# Patient Record
Sex: MALE | Race: WHITE | NOT HISPANIC OR LATINO | Employment: FULL TIME | URBAN - METROPOLITAN AREA
[De-identification: names, ages, dates, MRNs, and addresses within clinical notes are randomized per-mention and may not be internally consistent; named-entity substitution may affect disease eponyms.]

---
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
## 2022-04-25 ENCOUNTER — HOSPITAL ENCOUNTER (EMERGENCY)
Facility: HOSPITAL | Age: 22
Discharge: HOME | End: 2022-04-25
Attending: EMERGENCY MEDICINE
Payer: MEDICAID

## 2022-04-25 VITALS
RESPIRATION RATE: 18 BRPM | WEIGHT: 120 LBS | HEART RATE: 65 BPM | BODY MASS INDEX: 18.83 KG/M2 | SYSTOLIC BLOOD PRESSURE: 124 MMHG | OXYGEN SATURATION: 100 % | HEIGHT: 67 IN | DIASTOLIC BLOOD PRESSURE: 80 MMHG | TEMPERATURE: 98 F

## 2022-04-25 DIAGNOSIS — R09.1 PLEURISY: Primary | ICD-10-CM

## 2022-04-25 DIAGNOSIS — J06.9 UPPER RESPIRATORY TRACT INFECTION, UNSPECIFIED TYPE: ICD-10-CM

## 2022-04-25 DIAGNOSIS — R07.89 CHEST TIGHTNESS: ICD-10-CM

## 2022-04-25 LAB
INFLUENZA A, MOLECULAR: NEGATIVE
INFLUENZA B, MOLECULAR: NEGATIVE
SARS-COV-2 RDRP RESP QL NAA+PROBE: NEGATIVE
SPECIMEN SOURCE: NORMAL

## 2022-04-25 PROCEDURE — 93010 ELECTROCARDIOGRAM REPORT: CPT | Mod: ,,, | Performed by: INTERNAL MEDICINE

## 2022-04-25 PROCEDURE — U0002 COVID-19 LAB TEST NON-CDC: HCPCS | Performed by: NURSE PRACTITIONER

## 2022-04-25 PROCEDURE — 99284 EMERGENCY DEPT VISIT MOD MDM: CPT | Mod: 25

## 2022-04-25 PROCEDURE — 87502 INFLUENZA DNA AMP PROBE: CPT | Performed by: NURSE PRACTITIONER

## 2022-04-25 PROCEDURE — 93005 ELECTROCARDIOGRAM TRACING: CPT | Performed by: INTERNAL MEDICINE

## 2022-04-25 PROCEDURE — 93010 EKG 12-LEAD: ICD-10-PCS | Mod: ,,, | Performed by: INTERNAL MEDICINE

## 2022-04-25 RX ORDER — ALBUTEROL SULFATE 90 UG/1
1-2 AEROSOL, METERED RESPIRATORY (INHALATION) EVERY 6 HOURS PRN
Qty: 8 G | Refills: 0 | Status: SHIPPED | OUTPATIENT
Start: 2022-04-25 | End: 2023-04-25

## 2022-04-25 RX ORDER — IBUPROFEN 400 MG/1
400 TABLET ORAL EVERY 6 HOURS PRN
Qty: 20 TABLET | Refills: 0 | Status: SHIPPED | OUTPATIENT
Start: 2022-04-25

## 2022-04-25 NOTE — Clinical Note
"Nate Stock (Joshua)rickey was seen and treated in our emergency department on 4/25/2022.  He may return to work on 04/27/2022.       If you have any questions or concerns, please don't hesitate to call.      Irene Calderon NP"

## 2022-04-25 NOTE — FIRST PROVIDER EVALUATION
Emergency Department TeleTriage Encounter Note      CHIEF COMPLAINT    Chief Complaint   Patient presents with    Pleurisy     Pain to chest with breathing and sinus congestion today       VITAL SIGNS   Initial Vitals [04/25/22 1446]   BP Pulse Resp Temp SpO2   125/86 70 18 98.3 °F (36.8 °C) 100 %      MAP       --            ALLERGIES    Review of patient's allergies indicates:   Allergen Reactions    Penicillins Rash       PROVIDER TRIAGE NOTE  TeleTriage Note: aNte Ashley, a nontoxic/well appearing, 21 y.o. male, presented to the ED with c/o congestion, cough and pain with breathing that began 4/5 hours ago.     All ED beds are full at present; patient notified of this status.  Patient seen and medically screened by Nurse Practitioner via teletriage. Orders initiated at triage to expedite care.  Patient is stable to return to the waiting room and will be placed in an ED bed when available.  Care will be transferred to an alternate provider when patient has been placed in an Exam Room from the [facility] for physical exam, additional orders, and disposition.  2:59 PM Zulma Morgan DNP, FNP-C        ORDERS  Labs Reviewed - No data to display    ED Orders (720h ago, onward)    Start Ordered     Status Ordering Provider    04/25/22 1500 04/25/22 1459  X-Ray Chest PA And Lateral  1 time imaging         Acknowledged ZULMA MORGAN            Virtual Visit Note: The provider triage portion of this emergency department evaluation and documentation was performed via [employer], a HIPAA-compliant telemedicine application, in concert with a tele-presenter in the room. A face to face patient evaluation with one of my colleagues will occur once the patient is placed in an emergency department room.      DISCLAIMER: This note was prepared with [employer] voice recognition transcription software. Garbled syntax, mangled pronouns, and other bizarre constructions may be attributed to that software system.

## 2022-04-25 NOTE — DISCHARGE INSTRUCTIONS
Take over-the-counter Tylenol or prescription Motrin for symptom management.  Albuterol inhaler as needed for shortness of breath and/or wheezing symptoms. OTC mucinex and flonase for sinus congestion symptoms.  Continue closely monitor symptoms.  Follow-up with your PCP for ER visit follow-up re-evaluation.  Follow-up with cardiologist if her symptoms have fully resolved for further evaluation in the next 2-3 days.  Return to the ER for any new worsening symptoms.

## 2022-04-25 NOTE — ED PROVIDER NOTES
Encounter Date: 4/25/2022       History     Chief Complaint   Patient presents with    Pleurisy     Pain to chest with breathing and sinus congestion today     21-year-old male with no previous medical history, who states he stop smoking cigarettes approximately 1 month ago, presents to the ER with sinus congestion runny nose postnasal drip, and pain to his left anterior chest that comes and goes with deep breathing.  He states his symptoms began earlier today.  He currently reports his symptoms have completely resolved and feels much better.  He reports no fever.  No productive cough.  No nausea vomiting abdominal pain neck pain rash lightheadedness other complaints or concerns.  He states he was told he had childhood asthma but this has resolved.  Currently he denies all symptoms at this time.  He requests a work note because he needed to call and for work when his symptoms occurred earlier today.  He states pushing on his chest worsens his symptoms.        Review of patient's allergies indicates:   Allergen Reactions    Penicillins Rash     No past medical history on file.  No past surgical history on file.  No family history on file.     Review of Systems   Constitutional: Negative for chills, diaphoresis, fatigue and fever.   HENT: Positive for congestion, postnasal drip and rhinorrhea. Negative for dental problem, drooling, ear discharge, ear pain, facial swelling, mouth sores, nosebleeds, sinus pressure, sinus pain, sneezing, sore throat and trouble swallowing.    Eyes: Negative for photophobia and visual disturbance.   Respiratory: Positive for cough and chest tightness. Negative for choking, shortness of breath, wheezing and stridor.    Cardiovascular: Positive for chest pain. Negative for palpitations and leg swelling.   Gastrointestinal: Negative for abdominal pain, constipation, diarrhea, nausea and vomiting.   Endocrine: Negative for polydipsia, polyphagia and polyuria.   Genitourinary: Negative for  decreased urine volume, dysuria, flank pain, frequency, hematuria and urgency.   Musculoskeletal: Positive for myalgias. Negative for arthralgias, back pain, gait problem and neck pain.   Skin: Negative for color change, rash and wound.   Allergic/Immunologic: Negative for immunocompromised state.   Neurological: Negative for dizziness, seizures, syncope, weakness, light-headedness and headaches.   Hematological: Does not bruise/bleed easily.   Psychiatric/Behavioral: Negative for agitation, confusion and hallucinations. The patient is nervous/anxious.    All other systems reviewed and are negative.      Physical Exam     Initial Vitals [04/25/22 1446]   BP Pulse Resp Temp SpO2   125/86 70 18 98.3 °F (36.8 °C) 100 %      MAP       --         Physical Exam    Nursing note and vitals reviewed.  Constitutional: He appears well-developed and well-nourished. He is not diaphoretic. No distress.   HENT:   Head: Normocephalic and atraumatic.   Right Ear: External ear normal.   Left Ear: External ear normal.   Nose: Nose normal.   Mouth/Throat: Oropharynx is clear and moist. No oropharyngeal exudate.   Eyes: Conjunctivae are normal. Pupils are equal, round, and reactive to light.   Neck: Neck supple.   Normal range of motion.  Cardiovascular: Normal rate.   No murmur heard.  Pulmonary/Chest: Breath sounds normal. He has no wheezes. He has no rhonchi. He has no rales.   Abdominal: Abdomen is soft. Bowel sounds are normal. There is no abdominal tenderness.   Musculoskeletal:         General: No edema. Normal range of motion.      Cervical back: Normal range of motion and neck supple.      Comments: Patient has left anterior reproducible chest wall pain with palpation.  No crepitus.  No rib retractions with breathing.  No bruising no deformity.     Neurological: He is alert and oriented to person, place, and time. He has normal strength. GCS score is 15. GCS eye subscore is 4. GCS verbal subscore is 5. GCS motor subscore is 6.    Skin: Skin is warm and dry. Capillary refill takes less than 2 seconds. No rash noted. No erythema.   Psychiatric: He has a normal mood and affect. Thought content normal.         ED Course   Procedures  Labs Reviewed   SARS-COV-2 RNA AMPLIFICATION, QUAL   INFLUENZA A AND B ANTIGEN    Narrative:     Specimen Source->Nasopharyngeal Swab          Imaging Results          X-Ray Chest PA And Lateral (Final result)  Result time 04/25/22 15:45:33    Final result by Ricardo Guy MD (04/25/22 15:45:33)                 Narrative:    Reason: Left-sided chest pain.    FINDINGS:    PA and lateral chest without comparisons show normal cardiomediastinal silhouette.  Lungs are clear. Pulmonary vasculature is normal. No acute osseous abnormality.    IMPRESSION:    Normal chest.    Electronically signed by:  Ricardo Guy DO  4/25/2022 3:45 PM CDT Workstation: [id number]                               Medications - No data to display  Medical Decision Making:   Rapid COVID and flu or both negative.  His chest x-ray read is normal without any abnormalities.  No evidence of pneumonia.  EKG sinus bradycardia with a rate of 57 normal ST segments without any depression or elevation or arrhythmias.  On exam he has reproducible chest wall pain.  His lungs are clear my auscultation exam.  He appears in no distress.  He states his symptoms are resolved requesting a work note.  Patient does have symptoms of an upper respiratory infection and an element of pleurisy is likely.  We will have patient monitor his symptoms closely, take albuterol and Motrin for symptom management, and return to the ER for any new worsening symptoms.  If he continues have intermittent chest pain he can benefit from further evaluation with Cardiology.  Also we will recommend PCP follow-up.  [facility] as he does not currently have a PCP.  ER return precautions discussed in detail he understands when return back to the ER.                        Clinical Impression:   Final diagnoses:  [R07.89] Chest tightness  [R09.1] Pleurisy (Primary)  [J06.9] Upper respiratory tract infection, unspecified type          ED Disposition Condition    Discharge Stable        ED Prescriptions     Medication Sig Dispense Start Date End Date Auth. Provider    ibuprofen (ADVIL,MOTRIN) 400 MG tablet Take 1 tablet (400 mg total) by mouth every 6 (six) hours as needed. 20 tablet 4/25/2022  Irene Calderon NP    albuterol (PROVENTIL/VENTOLIN HFA) 90 mcg/actuation inhaler Inhale 1-2 puffs into the lungs every 6 (six) hours as needed for Wheezing. Rescue 8 g 4/25/2022 4/25/2023 Irene Calderon NP        Follow-up Information     Follow up With Specialties Details Why Contact Info Additional Information    [facility]  Schedule an appointment as soon as possible for a visit in 2 days for ER visit follow up and re-evaluation [address] [facility] [address]  [phone]       Shivam Guan MD Interventional Cardiology, Cardiology, Cardiovascular Disease Go to  As needed, If symptoms worsen [address] [facility]  [address]  [place] [address]  [phone]       formerly [facility] - Emergency Dept Emergency Medicine Go to  As needed, If symptoms worsen [address] [facility] [address]  [phone] 1st floor           Irene Calderon NP  04/25/22 2671

## 2022-05-17 ENCOUNTER — HOSPITAL ENCOUNTER (EMERGENCY)
Facility: HOSPITAL | Age: 22
Discharge: HOME | End: 2022-05-18
Attending: EMERGENCY MEDICINE
Payer: MEDICAID

## 2022-05-17 DIAGNOSIS — S63.502A SPRAIN OF LEFT WRIST, INITIAL ENCOUNTER: Primary | ICD-10-CM

## 2022-05-17 DIAGNOSIS — R52 PAIN: ICD-10-CM

## 2022-05-17 PROCEDURE — 99283 EMERGENCY DEPT VISIT LOW MDM: CPT

## 2022-05-17 RX ORDER — DICLOFENAC SODIUM 10 MG/G
2 GEL TOPICAL 4 TIMES DAILY
Qty: 20 G | Refills: 0 | Status: SHIPPED | OUTPATIENT
Start: 2022-05-17

## 2022-05-18 VITALS
OXYGEN SATURATION: 97 % | WEIGHT: 120 LBS | HEART RATE: 75 BPM | DIASTOLIC BLOOD PRESSURE: 89 MMHG | BODY MASS INDEX: 18.83 KG/M2 | RESPIRATION RATE: 20 BRPM | TEMPERATURE: 99 F | HEIGHT: 67 IN | SYSTOLIC BLOOD PRESSURE: 143 MMHG

## 2022-05-23 ENCOUNTER — PATIENT OUTREACH (OUTPATIENT)
Dept: EMERGENCY MEDICINE | Facility: HOSPITAL | Age: 22
End: 2022-05-23

## 2022-05-24 DIAGNOSIS — R09.1 PLEURISY: Primary | ICD-10-CM

## 2022-05-26 DIAGNOSIS — M25.512 LEFT SHOULDER PAIN: Primary | ICD-10-CM

## 2022-12-14 ENCOUNTER — OFFICE VISIT (OUTPATIENT)
Dept: FAMILY MEDICINE | Facility: CLINIC | Age: 22
End: 2022-12-14
Payer: MEDICAID

## 2022-12-14 VITALS
HEIGHT: 66 IN | WEIGHT: 124.63 LBS | SYSTOLIC BLOOD PRESSURE: 112 MMHG | BODY MASS INDEX: 20.03 KG/M2 | HEART RATE: 65 BPM | DIASTOLIC BLOOD PRESSURE: 70 MMHG | TEMPERATURE: 98 F | OXYGEN SATURATION: 99 % | RESPIRATION RATE: 18 BRPM

## 2022-12-14 DIAGNOSIS — Z00.00 ROUTINE HEALTH MAINTENANCE: ICD-10-CM

## 2022-12-14 DIAGNOSIS — F84.5 ASPERGER'S DISORDER: Primary | ICD-10-CM

## 2022-12-14 DIAGNOSIS — F41.9 ANXIETY: ICD-10-CM

## 2022-12-14 DIAGNOSIS — F32.A DEPRESSION, UNSPECIFIED DEPRESSION TYPE: ICD-10-CM

## 2022-12-14 DIAGNOSIS — Z13.39 ADHD (ATTENTION DEFICIT HYPERACTIVITY DISORDER) EVALUATION: ICD-10-CM

## 2022-12-14 DIAGNOSIS — S46.012A STRAIN OF MUSC/TEND THE ROTATOR CUFF OF LEFT SHOULDER, INIT: ICD-10-CM

## 2022-12-14 PROCEDURE — 3078F PR MOST RECENT DIASTOLIC BLOOD PRESSURE < 80 MM HG: ICD-10-PCS | Mod: CPTII,,, | Performed by: NURSE PRACTITIONER

## 2022-12-14 PROCEDURE — 3074F SYST BP LT 130 MM HG: CPT | Mod: CPTII,,, | Performed by: NURSE PRACTITIONER

## 2022-12-14 PROCEDURE — 99204 OFFICE O/P NEW MOD 45 MIN: CPT | Mod: S$PBB,,, | Performed by: NURSE PRACTITIONER

## 2022-12-14 PROCEDURE — 3008F PR BODY MASS INDEX (BMI) DOCUMENTED: ICD-10-PCS | Mod: CPTII,,, | Performed by: NURSE PRACTITIONER

## 2022-12-14 PROCEDURE — 99215 OFFICE O/P EST HI 40 MIN: CPT | Performed by: NURSE PRACTITIONER

## 2022-12-14 PROCEDURE — 1159F PR MEDICATION LIST DOCUMENTED IN MEDICAL RECORD: ICD-10-PCS | Mod: CPTII,,, | Performed by: NURSE PRACTITIONER

## 2022-12-14 PROCEDURE — 3074F PR MOST RECENT SYSTOLIC BLOOD PRESSURE < 130 MM HG: ICD-10-PCS | Mod: CPTII,,, | Performed by: NURSE PRACTITIONER

## 2022-12-14 PROCEDURE — 99204 PR OFFICE/OUTPT VISIT, NEW, LEVL IV, 45-59 MIN: ICD-10-PCS | Mod: S$PBB,,, | Performed by: NURSE PRACTITIONER

## 2022-12-14 PROCEDURE — 1159F MED LIST DOCD IN RCRD: CPT | Mod: CPTII,,, | Performed by: NURSE PRACTITIONER

## 2022-12-14 PROCEDURE — 3008F BODY MASS INDEX DOCD: CPT | Mod: CPTII,,, | Performed by: NURSE PRACTITIONER

## 2022-12-14 PROCEDURE — 3078F DIAST BP <80 MM HG: CPT | Mod: CPTII,,, | Performed by: NURSE PRACTITIONER

## 2022-12-14 RX ORDER — FLUOXETINE HYDROCHLORIDE 20 MG/1
20 CAPSULE ORAL DAILY
Qty: 30 CAPSULE | Refills: 2 | Status: SHIPPED | OUTPATIENT
Start: 2022-12-14 | End: 2023-03-14

## 2022-12-14 RX ORDER — BUSPIRONE HYDROCHLORIDE 10 MG/1
10 TABLET ORAL 2 TIMES DAILY
Qty: 60 TABLET | Refills: 2 | Status: SHIPPED | OUTPATIENT
Start: 2022-12-14 | End: 2023-01-11 | Stop reason: SINTOL

## 2022-12-14 NOTE — PROGRESS NOTES
Patient ID: Jorge Ellsworth is a 22 y.o. male.    Chief Complaint: Establish Care and Abdominal Pain    Mr. Ellsworth presents today to establish care with me as his PCP. He is a very pleasant 23 yo with a PMH of what his mom states is Aspergers syndrome diagnosed as a child, anxiety, depression and abdominal pain. He has not been seen by PCP in several years. He states he has not been able focus as of late and wanted evaluation for possible ADHD medication. He states he also has been having severe anxiety.     Another issues he wanted to discuss was rotator cuff injury that has not been evaluated by orthopedic surgeon in the past so he is requesting referral.     He states he has also been having intermittent abdominal pain.     Patient is new to me so medical and social history reviewed and patient's mom is on phone for reinforcement.     We spent additional time discussing overdue health maintenance.     Abdominal Pain  This is a recurrent problem. The current episode started more than 1 year ago. The onset quality is gradual. The problem occurs intermittently. The problem has been waxing and waning. The pain is located in the generalized abdominal region. The pain is moderate. The quality of the pain is aching. The abdominal pain does not radiate. Associated symptoms include anorexia and flatus. Pertinent negatives include no arthralgias, belching, constipation, diarrhea, fever, headaches, hematuria, myalgias, nausea or vomiting. Nothing aggravates the pain. The pain is relieved by Bowel movements, certain positions and sitting up. He has tried nothing for the symptoms. The treatment provided no relief.     Past Medical History:   Diagnosis Date    ADHD      History reviewed. No pertinent surgical history.      Tobacco History:  reports that he has never smoked. He has never used smokeless tobacco.      Review of patient's allergies indicates:   Allergen Reactions    Penicillins Rash       Current Outpatient  Medications:     busPIRone (BUSPAR) 10 MG tablet, Take 1 tablet (10 mg total) by mouth 2 (two) times daily., Disp: 60 tablet, Rfl: 2    CLONIDINE HCL ORAL, Take by mouth., Disp: , Rfl:     FLUoxetine 20 MG capsule, Take 1 capsule (20 mg total) by mouth once daily., Disp: 30 capsule, Rfl: 2    METHYLPHENIDATE HCL (CONCERTA ORAL), Take by mouth., Disp: , Rfl:     Review of Systems   Constitutional:  Positive for activity change and unexpected weight change. Negative for appetite change, fatigue and fever.   HENT:  Negative for congestion, dental problem, hearing loss, nosebleeds, postnasal drip, rhinorrhea, sinus pain, sore throat, tinnitus and trouble swallowing.    Eyes:  Negative for pain, discharge, itching and visual disturbance.   Respiratory:  Negative for cough, chest tightness, shortness of breath and wheezing.    Cardiovascular:  Negative for chest pain and palpitations.   Gastrointestinal:  Positive for abdominal pain, anorexia and flatus. Negative for blood in stool, constipation, diarrhea, nausea and vomiting.   Endocrine: Negative for cold intolerance, heat intolerance, polydipsia, polyphagia and polyuria.   Genitourinary:  Negative for difficulty urinating, flank pain, genital sores, hematuria and urgency.   Musculoskeletal:  Negative for arthralgias, joint swelling, myalgias and neck pain.   Skin:  Negative for rash and wound.   Allergic/Immunologic: Negative for environmental allergies and food allergies.   Neurological:  Negative for dizziness, weakness, light-headedness and headaches.   Hematological:  Negative for adenopathy. Does not bruise/bleed easily.   Psychiatric/Behavioral:  Positive for dysphoric mood. Negative for behavioral problems, confusion, decreased concentration, sleep disturbance and suicidal ideas. The patient is not nervous/anxious and is not hyperactive.         Objective:      Vitals:    12/14/22 1344   BP: 112/70   Pulse: 65   Resp: 18   Temp: 98.1 °F (36.7 °C)   SpO2: 99%  "  Weight: 56.5 kg (124 lb 9.6 oz)   Height: 5' 6" (1.676 m)     Physical Exam  Vitals reviewed.   Constitutional:       General: He is not in acute distress.     Appearance: Normal appearance. He is normal weight. He is not ill-appearing, toxic-appearing or diaphoretic.   HENT:      Head: Normocephalic and atraumatic.      Right Ear: Tympanic membrane and external ear normal. There is no impacted cerumen.      Left Ear: Tympanic membrane and external ear normal. There is no impacted cerumen.      Nose: Nose normal. No congestion or rhinorrhea.      Mouth/Throat:      Mouth: Mucous membranes are moist.      Pharynx: Oropharynx is clear. No oropharyngeal exudate or posterior oropharyngeal erythema.   Eyes:      General: No scleral icterus.        Right eye: No discharge.         Left eye: No discharge.      Extraocular Movements: Extraocular movements intact.      Conjunctiva/sclera: Conjunctivae normal.      Pupils: Pupils are equal, round, and reactive to light.   Cardiovascular:      Rate and Rhythm: Normal rate and regular rhythm.      Pulses: Normal pulses.      Heart sounds: Normal heart sounds. No murmur heard.    No friction rub. No gallop.   Pulmonary:      Effort: Pulmonary effort is normal. No respiratory distress.      Breath sounds: Normal breath sounds. No wheezing, rhonchi or rales.   Chest:      Chest wall: No tenderness.   Abdominal:      General: Abdomen is flat. Bowel sounds are normal.      Palpations: Abdomen is soft. There is no mass.      Tenderness: There is no abdominal tenderness. There is no guarding or rebound.          Comments: Area of abdominal pain    Musculoskeletal:         General: No swelling or signs of injury. Normal range of motion.      Cervical back: Normal range of motion and neck supple. No rigidity or tenderness.      Right lower leg: No edema.      Left lower leg: No edema.   Skin:     General: Skin is warm and dry.      Capillary Refill: Capillary refill takes less than 2 " seconds.      Coloration: Skin is not pale.      Findings: No bruising or erythema.   Neurological:      General: No focal deficit present.      Mental Status: He is alert and oriented to person, place, and time. Mental status is at baseline.      Motor: No weakness.      Coordination: Coordination normal.      Gait: Gait normal.   Psychiatric:         Mood and Affect: Mood normal.         Behavior: Behavior normal.         Thought Content: Thought content normal.         Judgment: Judgment normal.         Assessment:       1. Asperger's disorder    2. Anxiety    3. Depression, unspecified depression type    4. ADHD (attention deficit hyperactivity disorder) evaluation    5. Strain of musc/tend the rotator cuff of left shoulder, init    6. Routine health maintenance           Plan:       Asperger's disorder    Anxiety  -     Ambulatory referral/consult to Psychiatry; Future; Expected date: 12/21/2022  -     busPIRone (BUSPAR) 10 MG tablet; Take 1 tablet (10 mg total) by mouth 2 (two) times daily.  Dispense: 60 tablet; Refill: 2    Depression, unspecified depression type  -     Ambulatory referral/consult to Psychiatry; Future; Expected date: 12/21/2022  -     FLUoxetine 20 MG capsule; Take 1 capsule (20 mg total) by mouth once daily.  Dispense: 30 capsule; Refill: 2    ADHD (attention deficit hyperactivity disorder) evaluation  -     Ambulatory referral/consult to Psychiatry; Future; Expected date: 12/21/2022    Strain of musc/tend the rotator cuff of left shoulder, init  -     Ambulatory referral/consult to Physical/Occupational Therapy; Future; Expected date: 12/21/2022  -     Ambulatory referral/consult to Orthopedics; Future; Expected date: 12/21/2022    Routine health maintenance  -     CBC auto differential; Future; Expected date: 12/14/2022  -     Comprehensive Metabolic Panel; Future; Expected date: 12/14/2022  -     Lipid Panel; Future; Expected date: 12/14/2022  -     HIV 1/2 Ag/Ab (4th Gen); Future;  Expected date: 12/14/2022  -     Hepatitis C Antibody; Future; Expected date: 12/14/2022      Follow up in about 4 weeks (around 1/11/2023), or if symptoms worsen or fail to improve, for Anciety/Depression .        12/21/2022 Teresa Harris, NP

## 2022-12-21 ENCOUNTER — TELEPHONE (OUTPATIENT)
Dept: FAMILY MEDICINE | Facility: CLINIC | Age: 22
End: 2022-12-21

## 2022-12-21 NOTE — TELEPHONE ENCOUNTER
Patient's mother called with former doctor information for his medical records.   Cornel Jeansonne, MD.  [facility] [place] [address]  [phone]

## 2022-12-21 NOTE — TELEPHONE ENCOUNTER
Mother called stating the patient has been sleeping all day, nausea, pains in stomach, up all night and poor appetite is not good. Al started since new medication.   Please advise.

## 2022-12-29 ENCOUNTER — LAB VISIT (OUTPATIENT)
Dept: LAB | Facility: HOSPITAL | Age: 22
End: 2022-12-29
Attending: NURSE PRACTITIONER
Payer: MEDICAID

## 2022-12-29 DIAGNOSIS — Z00.00 ROUTINE HEALTH MAINTENANCE: ICD-10-CM

## 2022-12-29 LAB
ALBUMIN SERPL BCP-MCNC: 5.9 G/DL (ref 3.5–5.2)
ALP SERPL-CCNC: 57 U/L (ref 55–135)
ALT SERPL W/O P-5'-P-CCNC: 16 U/L (ref 10–44)
ANION GAP SERPL CALC-SCNC: 9 MMOL/L (ref 8–16)
AST SERPL-CCNC: 21 U/L (ref 10–40)
BASOPHILS # BLD AUTO: 0.04 K/UL (ref 0–0.2)
BASOPHILS NFR BLD: 0.5 % (ref 0–1.9)
BILIRUB SERPL-MCNC: 1.4 MG/DL (ref 0.1–1)
BUN SERPL-MCNC: 11 MG/DL (ref 6–20)
CALCIUM SERPL-MCNC: 9.9 MG/DL (ref 8.7–10.5)
CHLORIDE SERPL-SCNC: 103 MMOL/L (ref 95–110)
CHOLEST SERPL-MCNC: 123 MG/DL (ref 120–199)
CHOLEST/HDLC SERPL: 3.1 {RATIO} (ref 2–5)
CO2 SERPL-SCNC: 28 MMOL/L (ref 23–29)
CREAT SERPL-MCNC: 1 MG/DL (ref 0.5–1.4)
DIFFERENTIAL METHOD: ABNORMAL
EOSINOPHIL # BLD AUTO: 0.4 K/UL (ref 0–0.5)
EOSINOPHIL NFR BLD: 4.6 % (ref 0–8)
ERYTHROCYTE [DISTWIDTH] IN BLOOD BY AUTOMATED COUNT: 12.2 % (ref 11.5–14.5)
EST. GFR  (NO RACE VARIABLE): >60 ML/MIN/1.73 M^2
GLUCOSE SERPL-MCNC: 103 MG/DL (ref 70–110)
HCT VFR BLD AUTO: 51.3 % (ref 40–54)
HDLC SERPL-MCNC: 40 MG/DL (ref 40–75)
HDLC SERPL: 32.5 % (ref 20–50)
HGB BLD-MCNC: 17.9 G/DL (ref 14–18)
IMM GRANULOCYTES # BLD AUTO: 0.03 K/UL (ref 0–0.04)
IMM GRANULOCYTES NFR BLD AUTO: 0.4 % (ref 0–0.5)
LDLC SERPL CALC-MCNC: 72.4 MG/DL (ref 63–159)
LYMPHOCYTES # BLD AUTO: 1.6 K/UL (ref 1–4.8)
LYMPHOCYTES NFR BLD: 20.8 % (ref 18–48)
MCH RBC QN AUTO: 27.8 PG (ref 27–31)
MCHC RBC AUTO-ENTMCNC: 34.9 G/DL (ref 32–36)
MCV RBC AUTO: 80 FL (ref 82–98)
MONOCYTES # BLD AUTO: 0.5 K/UL (ref 0.3–1)
MONOCYTES NFR BLD: 6.9 % (ref 4–15)
NEUTROPHILS # BLD AUTO: 5.3 K/UL (ref 1.8–7.7)
NEUTROPHILS NFR BLD: 66.8 % (ref 38–73)
NONHDLC SERPL-MCNC: 83 MG/DL
NRBC BLD-RTO: 0 /100 WBC
PLATELET # BLD AUTO: 268 K/UL (ref 150–450)
PMV BLD AUTO: 10.8 FL (ref 9.2–12.9)
POTASSIUM SERPL-SCNC: 3.6 MMOL/L (ref 3.5–5.1)
PROT SERPL-MCNC: 8.8 G/DL (ref 6–8.4)
RBC # BLD AUTO: 6.44 M/UL (ref 4.6–6.2)
SODIUM SERPL-SCNC: 140 MMOL/L (ref 136–145)
TRIGL SERPL-MCNC: 53 MG/DL (ref 30–150)
WBC # BLD AUTO: 7.85 K/UL (ref 3.9–12.7)

## 2022-12-29 PROCEDURE — 85025 COMPLETE CBC W/AUTO DIFF WBC: CPT | Performed by: NURSE PRACTITIONER

## 2022-12-29 PROCEDURE — 80061 LIPID PANEL: CPT | Performed by: NURSE PRACTITIONER

## 2022-12-29 PROCEDURE — 80053 COMPREHEN METABOLIC PANEL: CPT | Performed by: NURSE PRACTITIONER

## 2022-12-29 PROCEDURE — 86803 HEPATITIS C AB TEST: CPT | Performed by: NURSE PRACTITIONER

## 2022-12-29 PROCEDURE — 36415 COLL VENOUS BLD VENIPUNCTURE: CPT | Performed by: NURSE PRACTITIONER

## 2022-12-29 PROCEDURE — 87389 HIV-1 AG W/HIV-1&-2 AB AG IA: CPT | Performed by: NURSE PRACTITIONER

## 2022-12-30 LAB
HCV AB S/CO SERPL IA: <0.1 S/CO RATIO (ref 0–0.9)
HIV 1+2 AB+HIV1 P24 AG SERPL QL IA: NON REACTIVE

## 2023-01-03 ENCOUNTER — OFFICE VISIT (OUTPATIENT)
Dept: ORTHOPEDICS | Facility: CLINIC | Age: 23
End: 2023-01-03
Payer: MEDICAID

## 2023-01-03 VITALS — WEIGHT: 124 LBS | HEIGHT: 66 IN | BODY MASS INDEX: 19.93 KG/M2

## 2023-01-03 DIAGNOSIS — G24.9 DYSKINESIA: Primary | ICD-10-CM

## 2023-01-03 DIAGNOSIS — S46.012A STRAIN OF MUSC/TEND THE ROTATOR CUFF OF LEFT SHOULDER, INIT: ICD-10-CM

## 2023-01-03 PROCEDURE — 3008F PR BODY MASS INDEX (BMI) DOCUMENTED: ICD-10-PCS | Mod: CPTII,S$GLB,, | Performed by: ORTHOPAEDIC SURGERY

## 2023-01-03 PROCEDURE — 3008F BODY MASS INDEX DOCD: CPT | Mod: CPTII,S$GLB,, | Performed by: ORTHOPAEDIC SURGERY

## 2023-01-03 PROCEDURE — 1159F PR MEDICATION LIST DOCUMENTED IN MEDICAL RECORD: ICD-10-PCS | Mod: CPTII,S$GLB,, | Performed by: ORTHOPAEDIC SURGERY

## 2023-01-03 PROCEDURE — 1160F RVW MEDS BY RX/DR IN RCRD: CPT | Mod: CPTII,S$GLB,, | Performed by: ORTHOPAEDIC SURGERY

## 2023-01-03 PROCEDURE — 99203 PR OFFICE/OUTPT VISIT, NEW, LEVL III, 30-44 MIN: ICD-10-PCS | Mod: S$GLB,,, | Performed by: ORTHOPAEDIC SURGERY

## 2023-01-03 PROCEDURE — 1160F PR REVIEW ALL MEDS BY PRESCRIBER/CLIN PHARMACIST DOCUMENTED: ICD-10-PCS | Mod: CPTII,S$GLB,, | Performed by: ORTHOPAEDIC SURGERY

## 2023-01-03 PROCEDURE — 1159F MED LIST DOCD IN RCRD: CPT | Mod: CPTII,S$GLB,, | Performed by: ORTHOPAEDIC SURGERY

## 2023-01-03 PROCEDURE — 99203 OFFICE O/P NEW LOW 30 MIN: CPT | Mod: S$GLB,,, | Performed by: ORTHOPAEDIC SURGERY

## 2023-01-03 NOTE — PROGRESS NOTES
[facility]    Subjective:     Chief Complaint:   Chief Complaint   Patient presents with    Left Shoulder - Pain     Left shoulder pain that started last year after a fall off of a bed. States that he is not sure of the extent of the injuries, but he was in a cast. States that it becomes very tender and like a muscle has been pulled       Past Medical History:   Diagnosis Date    ADHD        History reviewed. No pertinent surgical history.    Current Outpatient Medications   Medication Sig    busPIRone (BUSPAR) 10 MG tablet Take 1 tablet (10 mg total) by mouth 2 (two) times daily.    FLUoxetine 20 MG capsule Take 1 capsule (20 mg total) by mouth once daily.    CLONIDINE HCL ORAL Take by mouth.    METHYLPHENIDATE HCL (CONCERTA ORAL) Take by mouth.     No current facility-administered medications for this visit.       Review of patient's allergies indicates:   Allergen Reactions    Penicillins Rash       Family History   Problem Relation Age of Onset    Diabetes Mother        Social History     Socioeconomic History    Marital status: Single   Tobacco Use    Smoking status: Never    Smokeless tobacco: Never   Substance and Sexual Activity    Alcohol use: Never    Drug use: Never    Sexual activity: Not Currently     Partners: Female     Birth control/protection: None       History of present illness:  22-year-old male with left upper extremity pain.  Symptom onset was earlier this year after an accident.  He states that he was jumping or fell on the bed.  He sustained an injury to his left arm, he was seen and treated for a unknown fracture.  Medical records are not available for review today.  He primarily complains of left upper back pain left shoulder pain today.  Range of motion, clicking and popping.      Review of Systems:    Constitution: Negative for chills, fever, and sweats.  Negative for unexplained weight loss.    HENT:  Negative for headaches and blurry vision.    Cardiovascular:Negative for  chest pain or irregular heart beat. Negative for hypertension.    Respiratory:  Negative for cough and shortness of breath.    Gastrointestinal: Negative for abdominal pain, heartburn, melena, nausea, and vomitting.    Genitourinary:  Negative bladder incontinence and dysuria.    Musculoskeletal:  See HPI for details.     Neurological: Negative for numbness.    Psychiatric/Behavioral: Negative for depression.  The patient is not nervous/anxious.      Endocrine: Negative for polyuria    Hematologic/Lymphatic: Negative for bleeding problem.  Does not bruise/bleed easily.    Skin: Negative for poor would healing and rash    Objective:      Physical Examination:    Vital Signs:  There were no vitals filed for this visit.    Body mass index is 20.01 kg/m².    This a well-developed, well nourished patient in no acute distress.  They are alert and oriented and cooperative to examination.        Examination of the left upper extremity, no significant pain with neck range of motion.  Flexion extension lateral bending and rotation.  Shoulder, skin is dry and intact, no erythema ecchymosis, no signs symptoms of infection.  Range of motion forward flexion 180°, external rotation 90°, internal rotation to the interscapular region.  No pain with Neer's test, no pain with Walters test no pain with crossover testing, no pain or weakness with Yarelis's test.  Normal range of motion of the elbow no pain with resisted flexion extension or pronation or supination.  No tenderness over the epicondyles.  Pertinent New Results:    XRAY Report / Interpretation:   AP lateral views left shoulder taken today in the office do not demonstrate any acute osseous abnormalities.    Assessment/Plan:      Shoulder dyskinesia, for the patient physical therapy for evaluation and treatment.  Check him back in 6 weeks.  Conservative measures such as over-the-counter meds as needed for pain.    Elian Cruz, Physician Assistant, served in the capacity as a  ""scribe" for this patient encounter.  A "face-to-face" encounter occurred with Dr. Sav Mayo on this date.  The treatment plan and medical decision-making is outlined above. Patient was seen and examined with a chaperone.       This note was created using Dragon voice recognition software that occasionally misinterpreted phrases or words.        "

## 2023-01-10 ENCOUNTER — TELEPHONE (OUTPATIENT)
Dept: FAMILY MEDICINE | Facility: CLINIC | Age: 23
End: 2023-01-10

## 2023-01-10 NOTE — TELEPHONE ENCOUNTER
Per mom, referred psychiatrist does not eval children and requesting another provider. I advised mom, she can contact her insurance company and get informed who is accepting children for psych eval and her insurance then contact clinic with information and new referral can be placed. Mom satisfied with information given and will be at scheduled appt. Oscar

## 2023-01-11 ENCOUNTER — OFFICE VISIT (OUTPATIENT)
Dept: FAMILY MEDICINE | Facility: CLINIC | Age: 23
End: 2023-01-11
Payer: MEDICAID

## 2023-01-11 VITALS
SYSTOLIC BLOOD PRESSURE: 108 MMHG | HEIGHT: 66 IN | HEART RATE: 92 BPM | WEIGHT: 124.5 LBS | DIASTOLIC BLOOD PRESSURE: 70 MMHG | TEMPERATURE: 98 F | RESPIRATION RATE: 16 BRPM | BODY MASS INDEX: 20.01 KG/M2

## 2023-01-11 DIAGNOSIS — R10.32 UNILATERAL GROIN PAIN, LEFT: ICD-10-CM

## 2023-01-11 DIAGNOSIS — F41.9 ANXIETY: ICD-10-CM

## 2023-01-11 DIAGNOSIS — F32.A DEPRESSION, UNSPECIFIED DEPRESSION TYPE: ICD-10-CM

## 2023-01-11 DIAGNOSIS — Z13.39 ADHD (ATTENTION DEFICIT HYPERACTIVITY DISORDER) EVALUATION: Primary | ICD-10-CM

## 2023-01-11 PROCEDURE — 99213 PR OFFICE/OUTPT VISIT, EST, LEVL III, 20-29 MIN: ICD-10-PCS | Mod: S$PBB,,, | Performed by: NURSE PRACTITIONER

## 2023-01-11 PROCEDURE — 1159F PR MEDICATION LIST DOCUMENTED IN MEDICAL RECORD: ICD-10-PCS | Mod: CPTII,,, | Performed by: NURSE PRACTITIONER

## 2023-01-11 PROCEDURE — 3008F PR BODY MASS INDEX (BMI) DOCUMENTED: ICD-10-PCS | Mod: CPTII,,, | Performed by: NURSE PRACTITIONER

## 2023-01-11 PROCEDURE — 3074F SYST BP LT 130 MM HG: CPT | Mod: CPTII,,, | Performed by: NURSE PRACTITIONER

## 2023-01-11 PROCEDURE — 1159F MED LIST DOCD IN RCRD: CPT | Mod: CPTII,,, | Performed by: NURSE PRACTITIONER

## 2023-01-11 PROCEDURE — 99214 OFFICE O/P EST MOD 30 MIN: CPT | Performed by: NURSE PRACTITIONER

## 2023-01-11 PROCEDURE — 3078F PR MOST RECENT DIASTOLIC BLOOD PRESSURE < 80 MM HG: ICD-10-PCS | Mod: CPTII,,, | Performed by: NURSE PRACTITIONER

## 2023-01-11 PROCEDURE — 3008F BODY MASS INDEX DOCD: CPT | Mod: CPTII,,, | Performed by: NURSE PRACTITIONER

## 2023-01-11 PROCEDURE — 3078F DIAST BP <80 MM HG: CPT | Mod: CPTII,,, | Performed by: NURSE PRACTITIONER

## 2023-01-11 PROCEDURE — 3074F PR MOST RECENT SYSTOLIC BLOOD PRESSURE < 130 MM HG: ICD-10-PCS | Mod: CPTII,,, | Performed by: NURSE PRACTITIONER

## 2023-01-11 PROCEDURE — 99213 OFFICE O/P EST LOW 20 MIN: CPT | Mod: S$PBB,,, | Performed by: NURSE PRACTITIONER

## 2023-01-11 NOTE — PROGRESS NOTES
Patient ID: Jorge Ellsworth is a 22 y.o. male.    Chief Complaint: Follow-up (Pt c/o stomach/groin pain times 1 month. KM)    Mr. Ellsworth presents today for follow up and lab review. He was recently started on fluoxetine for depression and anxiety and he states it has been somewhat helpful with his depression but he continues to have anxiety but he could not tolerate buspirone so it has been discontinued. He has not been able to get in with psychiatry so he asking to have a new referral placed. Lastly he has been having groin  pain on the left side that is intermittent and very dull in nature. He states the pain sometimes radiates down his left leg and can become very painful. He denies any other issues or complaints at this time.     Groin Pain  The patient's primary symptoms include testicular pain. This is a new problem. The current episode started 1 to 4 weeks ago. The problem occurs every several days. The problem has been waxing and waning. The pain is moderate. Pertinent negatives include no chest pain, constipation, diarrhea, headaches, urgency or vomiting. The testicular pain affects the left testicle. The color of the testicles is Normal. The symptoms are aggravated by heavy lifting, tactile pressure and activity. He has tried a heat pack and rest for the symptoms. The treatment provided mild relief. He is not sexually active.         Past Medical History:   Diagnosis Date    ADHD      No past surgical history on file.      Tobacco History:  reports that he has never smoked. He has never used smokeless tobacco.      Review of patient's allergies indicates:   Allergen Reactions    Penicillins Rash       Current Outpatient Medications:     FLUoxetine 20 MG capsule, Take 1 capsule (20 mg total) by mouth once daily., Disp: 30 capsule, Rfl: 2    CLONIDINE HCL ORAL, Take by mouth., Disp: , Rfl:     Review of Systems   Constitutional:  Negative for activity change and unexpected weight change.   HENT:  Negative  "for hearing loss, rhinorrhea and trouble swallowing.    Eyes:  Negative for discharge and visual disturbance.   Respiratory:  Negative for chest tightness and wheezing.    Cardiovascular:  Negative for chest pain and palpitations.   Gastrointestinal:  Negative for blood in stool, constipation, diarrhea and vomiting.   Endocrine: Negative for polydipsia and polyuria.   Genitourinary:  Positive for testicular pain. Negative for difficulty urinating, hematuria and urgency.   Musculoskeletal:  Negative for arthralgias, joint swelling and neck pain.   Neurological:  Negative for weakness and headaches.   Psychiatric/Behavioral:  Positive for dysphoric mood. Negative for confusion.         Objective:      Vitals:    01/11/23 1414   BP: 108/70   Pulse: 92   Resp: 16   Temp: 98.3 °F (36.8 °C)   Weight: 56.5 kg (124 lb 8 oz)   Height: 5' 6" (1.676 m)     Physical Exam  Vitals reviewed.   Constitutional:       General: He is not in acute distress.     Appearance: Normal appearance. He is normal weight. He is not ill-appearing, toxic-appearing or diaphoretic.   HENT:      Head: Normocephalic and atraumatic.      Right Ear: Tympanic membrane and external ear normal. There is no impacted cerumen.      Left Ear: Tympanic membrane and external ear normal. There is no impacted cerumen.      Nose: Nose normal. No congestion or rhinorrhea.      Mouth/Throat:      Mouth: Mucous membranes are moist.      Pharynx: Oropharynx is clear. No oropharyngeal exudate or posterior oropharyngeal erythema.   Eyes:      General: No scleral icterus.        Right eye: No discharge.         Left eye: No discharge.      Extraocular Movements: Extraocular movements intact.      Conjunctiva/sclera: Conjunctivae normal.      Pupils: Pupils are equal, round, and reactive to light.   Cardiovascular:      Rate and Rhythm: Normal rate and regular rhythm.      Pulses: Normal pulses.      Heart sounds: Normal heart sounds. No murmur heard.    No friction rub. " No gallop.   Pulmonary:      Effort: Pulmonary effort is normal. No respiratory distress.      Breath sounds: Normal breath sounds. No wheezing, rhonchi or rales.   Chest:      Chest wall: No tenderness.   Abdominal:      General: Abdomen is flat. Bowel sounds are normal.      Palpations: Abdomen is soft. There is no mass.      Tenderness: There is abdominal tenderness. There is no guarding or rebound.          Comments: Area of groin pain and tenderness   Musculoskeletal:         General: No swelling or signs of injury. Normal range of motion.      Cervical back: Normal range of motion and neck supple. No rigidity or tenderness.      Right lower leg: No edema.      Left lower leg: No edema.   Skin:     General: Skin is warm and dry.      Capillary Refill: Capillary refill takes less than 2 seconds.      Coloration: Skin is not pale.      Findings: No bruising or erythema.   Neurological:      General: No focal deficit present.      Mental Status: He is alert and oriented to person, place, and time. Mental status is at baseline.      Motor: No weakness.      Coordination: Coordination normal.      Gait: Gait normal.   Psychiatric:         Mood and Affect: Mood normal.         Behavior: Behavior normal.         Thought Content: Thought content normal.         Judgment: Judgment normal.         Assessment:       1. ADHD (attention deficit hyperactivity disorder) evaluation    2. Unilateral groin pain, left    3. Depression, unspecified depression type    4. Anxiety           Plan:       ADHD (attention deficit hyperactivity disorder) evaluation  -     Ambulatory referral/consult to Psychiatry; Future; Expected date: 01/18/2023    Unilateral groin pain, left  -     US Abdomen Pelvis Doppler Study Limited; Future; Expected date: 01/11/2023    Depression, unspecified depression type  -     Ambulatory referral/consult to Psychiatry; Future; Expected date: 01/18/2023    Anxiety  -     Ambulatory referral/consult to  Psychiatry; Future; Expected date: 01/18/2023      No follow-ups on file.        1/11/2023 Teresa Harris NP

## 2023-01-24 ENCOUNTER — CLINICAL SUPPORT (OUTPATIENT)
Dept: REHABILITATION | Facility: HOSPITAL | Age: 23
End: 2023-01-24
Attending: ORTHOPAEDIC SURGERY
Payer: MEDICAID

## 2023-01-24 ENCOUNTER — TELEPHONE (OUTPATIENT)
Dept: FAMILY MEDICINE | Facility: CLINIC | Age: 23
End: 2023-01-24

## 2023-01-24 ENCOUNTER — HOSPITAL ENCOUNTER (OUTPATIENT)
Dept: RADIOLOGY | Facility: HOSPITAL | Age: 23
Discharge: HOME | End: 2023-01-24
Attending: NURSE PRACTITIONER
Payer: MEDICAID

## 2023-01-24 DIAGNOSIS — R10.32 UNILATERAL GROIN PAIN, LEFT: ICD-10-CM

## 2023-01-24 DIAGNOSIS — S46.012A STRAIN OF MUSC/TEND THE ROTATOR CUFF OF LEFT SHOULDER, INIT: Primary | ICD-10-CM

## 2023-01-24 PROCEDURE — 97165 OT EVAL LOW COMPLEX 30 MIN: CPT | Mod: PN

## 2023-01-24 PROCEDURE — 76882 US LMTD JT/FCL EVL NVASC XTR: CPT | Mod: TC,PO,LT

## 2023-01-24 NOTE — PLAN OF CARE
[facility] Occupational Therapy  Initial Evaluation   Date: 1/24/2023  Name: Jorge Ellsworth  Clinic Number: [id number]  Therapy Diagnosis:   Encounter Diagnosis   Name Primary?    Strain of musc/tend the rotator cuff of left shoulder, init    Physician: Sav Mayo MD  Physician Orders: eval and treat  2-3 times a week for 4-6 weeks.   Medical Diagnosis: S46.012A (ICD-10-CM) - Strain of musc/tend the rotator cuff of left shoulder  Surgical Procedure and Date: N/A  Evaluation Date: 1/24/2023  Insurance Authorization Period Expiration: 12/31/23  Plan of Care Certification Period: 1/24/23-3/10/23  Date of Return to MD: after completes therapy  Visit # / Visits authorized: Evette 1/1  Time In:10:30  Time Out: 11:15  Total Billable Time: 0 minutes    Precautions:  Standard    Subjective   Patient states: Painful all the time. Indicated pain from upper scapula down front of arm to wrist. Feels really tight. Some days wake up and ok and others very painful.  Feel muscle straining in the forearm. Usually pain alternating between shoulder and forearm. Use of (middle) finger increase in discomfort all the way to the shoulder. Hard to get comfortable to sleep. Shoulder pops, without pain, randomly.  Involved Side: left   Dominant Side: Right  Date of Onset: initial injury a year ago  History of Current Condition/Mechanism of Injury: about a year ago fell on arm and had a cast on forearm for about a month At time of injury felt into shoulder. Shoulder has gotten worse  Imaging: Xray 1/03 per MD note AP lateral views left shoulder do not demonstrate any acute osseous abnormalities  Previous Therapy: none    Past Medical History/Physical Systems Review:    has a past medical history of ADHD.   has no past surgical history on file.  has a current medication list which includes the following prescription(s): clonidine hcl and fluoxetine.    Review of patient's allergies indicates:   Allergen Reactions    Penicillins  Rash   Patient's Goals for Therapy: make arm feel better and get to better health    Pain:  Functional Pain Scale Rating 0-10: at eval  tight and uncomfortable  0/10 at least  7/10 at worst   Location: left shoulder to forearm  Description: Tight, occasional shooting pain   Aggravating Factors: over use lifting of heavy things,  video game everything tightens up  Easing Factors: stretching  Occupation:  not working or school  Functional Limitations/Social History:  Previous functional status includes: Independent with all self care and home management.  Current Functional Status   Home/Living environment : lives with someone   ADL Assessment  ADL    Dressing independent   Eating independent   Toileting independent   Cooking Sometimes with difficulty depending on weight    Bathing/Grooming independent   Household tasks Independent    By patient report             FOTO score: 1/24/23  60%  Goal  74%  Objective   Observation: very slouched posture with very forward head, rounded shoulders and posterior tilt of pelvis. C spine lateral flexion to either side ~ 30 degrees, rotation right 50,  left 60  pain noted at left neck and levator with all movements  pain at right neck with rotation to the left. No muscle tone abnormalities noted  Palpation:  moderate tenderness at levator, left neck, and lower trapezius, mild at pectoralis and subscapularis and between radius and ulnar throughout forearm    Shoulder - Range Of Motion   1/24/2023   Motion A/PROM Left   Flexion 153/165   Abduction 95/155    Adduction WNL   Internal Rotation  Between scapula   External Rotation 90       end feels: rotations pain at lower trapezius and noted popping at scapula with IR                      Abduction pop at scapula, lower trapezius pain  MMT Shoulder girdle distally 5/5 throughout with pain for most; abduction pain levator and lower trapezius,  bicep pain with supination resistance; wrist extension pain at mid forearm longitudinally  between radius and ulnar    noted shoulder feeling tired post MMT testing    Hand -  Strength  Jaymar dynamometer 2nd Rung in lbs    1/24/2023 1/24/2023   Position Right Left    Average of 3 trials 61 56      Home Exercise Program/Education:  Issued HEP (see patient instructions in EMR). Exercises were reviewed and he was able to demonstrate them prior to the end of the session. Pt received a written copy of exercises to perform at home. he demonstrated good  understanding of the education provided.  Pt was advised to perform these exercises free of pain, and to stop performing them if pain occurs.  Patient Education: role of OT, goals for OT, scheduling/cancellations - pt verbalized understanding. Discussed insurance limitations with patient.    Assessment     Patient is a 22 y.o. right handed male presenting to skilled OT with diagnosis of left shoulder rotator cuff strain with initial injury 1 year ago with pain progressing. Patient with pain of 0/10 to 7/10 described as constant tightness throughout arm with occasional shooting pain down anterior arm to wrist.  He is minimally to moderately tender to palpation at left forearm and at left pectoralis and scapula musculature.  His ROM is minimally to moderately limited in shoulder flexion and abduction and in cervical rotations and lateral flexion bilaterally.  His strength is  5/5 throughout left upper extremity with pain and noted fatigue at shoulder post testing .  His left  strength is WNL as compared to the right.  He reports independence with self care and daily activities; however, performing with pain and difficulty. His FOTO score: 60%.  A brief history was obtained from the patient and MD note.  During the evaluation, the patient required no  modification or assistance.  There are no anticipated barriers/co-morbid conditions/personal factors may affect the plan of care.   Patient will benefit from OT to address problems hindering functional  use of UE. The following goals were discussed with the patient and patient is in agreement with them as to be addressed in the treatment plan. The patient's rehab potential is Good. Patient's educational, spiritual, cultural needs were considered.       Goals:   Short Term Goals  Independent in home program of posture and scapula strengthening in 3 visits  Patient with decrease in constant pain to frequent pain and no greater than 5/10 in 3 weeks  Patient with increase in shoulder abduction to WNL in 3 weeks  Patient noting able to perform self care with decrease in symptoms in 3 weeks  Long Term Goals  Patient with decrease in pain to intermittent and able to sleep with little to no increase in symptoms in 4 weeks  Patient observed to maintain better posture during therapy session in 4 weeks  Patient with decrease in pain and increase in use for household tasks including lifting with little to no increase in symptoms in 6 weeks  Patient with reports of no longer with shooting pain and decrease in worst pain to 3/10 in 6 weeks  Improve FOTO score by 8 points indicating improved function of left upper extremity  Plan   Outpatient Occupational Therapy 2 times weekly for 4 weeks then may decrease to 1 time a week for 2 weeks (will reassess periodically and adjust as needed) to include the following interventions:  Home Exercise and Stretching, Patient Education, Therapeutic Exercise (00820) - Improve muscle strength, ROM, flexibility and muscle function, Manual Stretching (72544) - Passive or Active stretching to improve muscle length and function, Soft Tissue Mobs (29986) - Increase ROM tissue length, joint mechanics and modulate pain, Cryotherapy (00770) - Application of cold to decrease local swelling and decrease pain , Heat (78934) -  Application of heat to increase local circulation and decrease pain, Ultrasound (22919) - Increase local circulation, improve tissue healing time, and modulate pain,  Therapeutic  activities (98914) use of dynamic activities to improve functional performance, Kinesio taping.    MARTHA Carey

## 2023-01-24 NOTE — TELEPHONE ENCOUNTER
Per Mom, another referral need to be submitted to Psychiatry Dr. Emelia Ramon stating medication management. Pt able to be tx at facility. KM

## 2023-01-24 NOTE — PATIENT INSTRUCTIONS
Instruction and demonstration given of erect sitting posture which he is to practice throughout the day holding for at least one minute.

## 2023-02-01 ENCOUNTER — CLINICAL SUPPORT (OUTPATIENT)
Dept: REHABILITATION | Facility: HOSPITAL | Age: 23
End: 2023-02-01
Payer: MEDICAID

## 2023-02-01 DIAGNOSIS — S46.012A STRAIN OF MUSC/TEND THE ROTATOR CUFF OF LEFT SHOULDER, INIT: Primary | ICD-10-CM

## 2023-02-01 PROCEDURE — 97530 THERAPEUTIC ACTIVITIES: CPT | Mod: PN

## 2023-02-01 NOTE — PROGRESS NOTES
[facility] Occupational Therapy Daily Treatment Note   Date: 2/1/2023  Name: Jorge Ellsworth  Clinic Number: [id number]  Therapy Diagnosis:        Encounter Diagnosis   Name Primary?    Strain of musc/tend the rotator cuff of left shoulder, init     Physician: Sav Mayo MD  Physician Orders: eval and treat  2-3 times a week for 4-6 weeks.   Medical Diagnosis: S46.012A (ICD-10-CM) - Strain of musc/tend the rotator cuff of left shoulder  Surgical Procedure and Date: N/A  Evaluation Date: 1/24/2023  Insurance Authorization Period Expiration: 05/02/23  Plan of Care Certification Period: 1/24/23-3/10/23  Date of Return to MD: after completes therapy  Visit # / Visits authorized: Evette 1/1   1/10  Time In: 7:47  Time Out:8:30   Total Billable Time: 43 minutes    Precautions:  Standard    Subjective   Pt reports: Woke up with soreness indicating tenderness at levator and upper trapezius area   was compliant with home exercise program given    Response to previous treatment: was a little sore  Functional change: none noted  Pain: 5/10  Location: left shoulder      Objective    Patient received the following treatment:   Therapeutic activities to improve functional performance with use of dynamic activities for 38 minutes including:  -after being cleared for contradictions      Moist heat to left shoulder for 8 minutes to increase blood flow, circulation, and tissue elasticity, and pain management began Soft tissue mobilization after 5 minutes to levator and upper trapezius area for 5 minutes to increase tissue elasticity and decrease pain   - Joint mobilizations, grade II for 2 minutes to increase joint mobility, ROM and for pain management. Distraction and inferior and posterior glides of glenohumeral  joint   And scapula mobility able to get to PIPs under scapula-noting discomfort into pectoralis  -Active assistive/Passive range of motion shoulder all planes in reclined  -In reclined horizontal  abduction with various combinations of elbow extension-noting pain along medical aspect of upper arm into forearm  -in right sidelying 1# horizontal abduction and abduction x 15 repetitions each  -RED theraband in standing bilateral retraction and extension x 20 repetitions  each  -RED theraband step aways for external rotation @ 0 degrees with small towel roll x 15 repetitions       Instruction and demonstration given of all introduced   Patient required moderate cuing for correct performance of exercise.    Home Exercises and Education Provided   Education provided:  - discussed insurance limitation  - Progress towards goals     Written Home Exercises Provided:2/01/23 theraband exercise as above and given RED theraband   Patient instructed to cont prior HEP. Exercises were reviewed and he was able to demonstrate them prior to the end of the session. he demonstrated good  understanding of the HEP provided. See EMR under Patient Instructions for exercises provided prior visit.  And 2/01/23.    Assessment   Patient reported felt better by end of session. He performed exercise with minimal increase in discomfort except for horizontal abduction with elbow extended increased pain at anterior shoulder and from upper arm to forearm along medial aspect of arm. Patient knowledgeable of added exercise to home program. Pt  will continue to benefit from skilled OT to further address pain and deficits in ROM and strength which are hindering ability to perform self care and IADLs. Patient's cultural,spiritual, and educational needs were considered    Goals:  Short Term Goals  Independent in home program of posture and scapula strengthening in 3 visits-ongoing  Patient with decrease in constant pain to frequent pain and no greater than 5/10 in 3 weeks  Patient with increase in shoulder abduction to WNL in 3 weeks  Patient noting able to perform self care with decrease in symptoms in 3 weeks  Plan   Continue skilled therapy 2  times a week for 4 weeks then may decrease to 1 time a week for 2 weeks  (will reassess periodically and adjust as needed) including therapeutic exercises and activities, manual therapy, and pain modalities  as needed   next session: open book and seated row      MARTHA Carey

## 2023-02-09 ENCOUNTER — CLINICAL SUPPORT (OUTPATIENT)
Dept: REHABILITATION | Facility: HOSPITAL | Age: 23
End: 2023-02-09
Payer: MEDICAID

## 2023-02-09 ENCOUNTER — TELEPHONE (OUTPATIENT)
Dept: PSYCHIATRY | Facility: CLINIC | Age: 23
End: 2023-02-09
Payer: MEDICAID

## 2023-02-09 DIAGNOSIS — S46.012A STRAIN OF MUSC/TEND THE ROTATOR CUFF OF LEFT SHOULDER, INIT: Primary | ICD-10-CM

## 2023-02-09 PROCEDURE — 97530 THERAPEUTIC ACTIVITIES: CPT | Mod: PN

## 2023-02-09 NOTE — TELEPHONE ENCOUNTER
Called to schedule new patient appointment with Dr. Gardner.  Patients mother answered, stated patient isn't available. She will give him the message and clinic number.

## 2023-02-09 NOTE — PROGRESS NOTES
[facility] Occupational Therapy Daily Treatment Note   Date: 2/9/2023  Name: Jorge Ellsworth  Clinic Number: [id number]  Therapy Diagnosis:        Encounter Diagnosis   Name Primary?    Strain of musc/tend the rotator cuff of left shoulder, init     Physician: Sav Mayo MD  Physician Orders: eval and treat  2-3 times a week for 4-6 weeks.   Medical Diagnosis: S46.012A (ICD-10-CM) - Strain of musc/tend the rotator cuff of left shoulder  Surgical Procedure and Date: N/A  Evaluation Date: 1/24/2023  Insurance Authorization Period Expiration: 05/02/23  Plan of Care Certification Period: 1/24/23-3/10/23  Date of Return to MD: after completes therapy  Visit # / Visits authorized: Evette 1/1   2/10  Time In: 7:28  Time Out:8:15   Total Billable Time: 47 minutes    Precautions:  Standard    Subjective   Pt reports: Feels better when exercise. Been cracking no pain. Not hurting as much when use it.   was compliant with home exercise program given    Response to previous treatment: was a little sore  Functional change: none noted  Pain: 4/10  Location: left shoulder      Objective    Patient received the following treatment:   Therapeutic activities to improve functional performance with use of dynamic activities for 47 minutes including:  -after being cleared for contradictions      Moist heat to left shoulder for 8 minutes to increase blood flow, circulation, and tissue elasticity, and pain management began Soft tissue mobilization after 5 minutes to levator and upper trapezius area for 5 minutes to increase tissue elasticity and decrease pain   - Joint mobilizations, grade II for 2 minutes to increase joint mobility, ROM and for pain management. Distraction and inferior and posterior glides of glenohumeral  joint   And scapula mobility able to get to PIPs under scapula  -Active assistive/Passive range of motion shoulder all planes in reclined  -In reclined horizontal abduction with various  combinations of elbow extension-noting pain along medial aspect of upper arm into forearm  -in right sidelying 1# horizontal abduction and abduction added: external rotation @ 0 dgs with towel roll  x 2 x 10 repetitions each   ADDED:open book with assistance for achieving range -discomfort medial upper arm into forearm  ADDED: seated row 20 repetitions 20# seat pad 3 chest pad 3  ADDED: pectoralis corner stretch 2 repetitions 20 seconds hold  ADDED: posture awareness positioning into cervical spine at neutral and scapula in resting position then hold, cervical rotation and lateral flexion bilaterally   Instruction and demonstration given of all introduced   Patient required moderate cuing for correct performance of exercise.    Home Exercises and Education Provided   Education provided:  - discussed insurance limitation  - Progress towards goals     Written Home Exercises Provided:2/09/23 pectoralis corner stretches 30 seconds hold 3 repetitions 2 times a day 2/01/23 theraband exercise as above and given RED theraband   Patient instructed to cont prior HEP. Exercises were reviewed and he was able to demonstrate them prior to the end of the session. he demonstrated good  understanding of the HEP provided. See EMR under Patient Instructions for exercises provided prior visit.  And 2/01/23.    Assessment   Patient noting decrease in pain with performing exercise. Getting a little easier to do some activities. Popping at shoulder noted with horizontal abduction and flexion exercise. Continues with pain along medial upper arm to forearm with horizontal abduction. Pectoralis stretches noted discomfort at pectoralis area. Seated row noted discomfort throughout shoulder girdle. Patient knowledgeable of added exercise to home program. Pt  will continue to benefit from skilled OT to further address pain and deficits in ROM and strength which are hindering ability to perform self care and IADLs. Patient's cultural,spiritual,  and educational needs were considered    Goals:  Short Term Goals  Independent in home program of posture and scapula strengthening in 3 visits-partially met  Patient with decrease in constant pain to frequent pain and no greater than 5/10 in 3 weeks-ongoing  Patient with increase in shoulder abduction to WNL in 3 weeks-ongoing  Patient noting able to perform self care with decrease in symptoms in 3 weeks-ongoing  Plan   Continue skilled therapy 2 times a week for 4 weeks then may decrease to 1 time a week for 2 weeks  (will reassess periodically and adjust as needed) including therapeutic exercises and activities, manual therapy, and pain modalities  as needed   next session: assess ROM      MARTHA Carey

## 2023-02-22 ENCOUNTER — CLINICAL SUPPORT (OUTPATIENT)
Dept: REHABILITATION | Facility: HOSPITAL | Age: 23
End: 2023-02-22
Payer: MEDICAID

## 2023-02-22 DIAGNOSIS — S46.012A STRAIN OF MUSC/TEND THE ROTATOR CUFF OF LEFT SHOULDER, INIT: Primary | ICD-10-CM

## 2023-02-22 PROCEDURE — 97530 THERAPEUTIC ACTIVITIES: CPT | Mod: PN

## 2023-02-22 NOTE — PROGRESS NOTES
Patient not seen since 2/09 due to cancel and no show          [facility] Daily [facility]   Date: 2/22/2023  Name: Jorge Ellsworth  Clinic Number: [id number]  Therapy Diagnosis:        Encounter Diagnosis   Name Primary?    Strain of musc/tend the rotator cuff of left shoulder, init     Physician: Sav Mayo MD  Physician Orders: eval and treat  2-3 times a week for 4-6 weeks.   Medical Diagnosis: S46.012A (ICD-10-CM) - Strain of musc/tend the rotator cuff of left shoulder  Surgical Procedure and Date: N/A  Evaluation Date: 1/24/2023  Insurance Authorization Period Expiration: 05/02/23  Plan of Care Certification Period: 1/24/23-3/10/23  Date of Return to MD: after completes therapy  Visit # / Visits authorized: Evette 1/1   3/10  Time In: 10:48  Time Out:11:30   Total Billable Time: 42 minutes    Precautions:  Standard    Subjective   Pt reports: Feeling a lot better. Hasn't been cracking and popping. Haven't had pain in the upper arm a little in the forearm and hand when I using it.    was compliant with home exercise program given    Response to previous treatment: was a little sore  Functional change: none noted  Pain: 0/10    little discomfort in forearm    in past week 6/10  2 times last short period of time   Location: left shoulder      Objective   Shoulder - Range Of Motion  LEFT    2/22/23 1/24/2023   Motion A/PROM A/PROM    Flexion 165 153/165   Abduction 175 95/155    Adduction WNL WNL   Internal Rotation WNL   Between scapula   External Rotation WNL 90       end feels: rotations pain at lower trapezius and noted popping at scapula with IR                        MMT 1/24 Shoulder girdle distally 5/5 throughout with pain for most; abduction pain levator and lower trapezius,  bicep pain with supination resistance; wrist extension pain at mid forearm longitudinally between radius and ulnar    noted shoulder feeling tired post MMT testing     2/22 no pain with  flexion   abduction pain underarm and anterior shoulder with positioning, internal rotation and external rotation discomfort in the forearm     bicep and forearm supinator and pronator no pain   noting tightness in the wrist into forearm    4-/5 lower and mid trapezius, post deltoid and    Patient received the following treatment:   Therapeutic activities to improve functional performance with use of dynamic activities for 42 minutes including:  -after being cleared for contradictions      Moist heat to left shoulder for 8 minutes to increase blood flow, circulation, and tissue elasticity, and pain management began Soft tissue mobilization after 5 minutes to levator and upper trapezius area for 5 minutes to increase tissue elasticity and decrease pain   - Joint mobilizations, grade II for 2 minutes to increase joint mobility, ROM and for pain management. Distraction and inferior and posterior glides of glenohumeral  joint   And scapula mobility able to get to PIPs under scapula  -Active assistive/Passive range of motion shoulder all planes in reclined  -prone 2# extension and row; 1# horizontal abduction 2 x 10 repetitions each  -In reclined horizontal abduction with various combinations of elbow extension-noting pain along medial aspect of upper arm into forearm  -in right sidelying 1# abduction, external rotation @ 0 dgs with towel roll  x 2 x 10 repetitions each  -in supine, cervical rotation right and left and rotation bilaterally 10 repetitions each      Instruction and demonstration given of all introduced   Patient required moderate cuing for correct performance of exercise.    Home Exercises and Education Provided   Education provided:  - discussed insurance limitation  - Progress towards goals     Written Home Exercises Provided:2/22 prone extension row and horizontal abduction as above 2/09/23 pectoralis corner stretches 30 seconds hold 3 repetitions 2 times a day 2/01/23 theraband exercise as above and  given RED theraband   Patient instructed to cont prior HEP. Exercises were reviewed and he was able to demonstrate them prior to the end of the session. he demonstrated good  understanding of the HEP provided. See EMR under Patient Instructions for exercises provided prior visit.  And 2/01/23, 2/22/23    Assessment   Patient noting doing much better. His Active range of motion has increased 70 degrees in abduction and flexion of 12 degrees to WNL.  He is exhibiting decrease in scapula mm stretch as well as posterior deltoid. He noted most pain at pectoralis post prone exercise. Pectoralis pain also noted with transitioning from supine to right sidelying. He continues to note forearm and wrist pain. Pt  will continue to benefit from skilled OT to further address pain and deficits in ROM and strength which are hindering ability to perform self care and IADLs. Patient's cultural,spiritual, and educational needs were considered    Goals:  Short Term Goals  Independent in home program of posture and scapula strengthening in 3 visits-partially met  Patient with decrease in constant pain to frequent pain and no greater than 5/10 in 3 weeks-ongoing  Patient with increase in shoulder abduction to WNL in 3 weeks-met  Patient noting able to perform self care with decrease in symptoms in 3 weeks-met  Long Term Goals  Patient with decrease in pain to intermittent and able to sleep with little to no increase in symptoms in 4 weeks-met  Patient observed to maintain better posture during therapy session in 4 weeks  Patient with decrease in pain and increase in use for household tasks including lifting with little to no increase in symptoms in 6 weeks ongoing  Patient with reports of no longer with shooting pain and decrease in worst pain to 3/10 in 6 weeks-noted in the forearm  Improve FOTO score by 8 points indicating improved function of left upper extremity  Plan   Continue skilled therapy 2 times a week for 4 weeks then may  decrease to 1 time a week for 2 weeks  (will reassess periodically and adjust as needed) including therapeutic exercises and activities, manual therapy, and pain modalities  as needed   next session: MARTHA Haynes

## 2023-02-24 ENCOUNTER — CLINICAL SUPPORT (OUTPATIENT)
Dept: REHABILITATION | Facility: HOSPITAL | Age: 23
End: 2023-02-24
Payer: MEDICAID

## 2023-02-24 DIAGNOSIS — S46.012A STRAIN OF MUSC/TEND THE ROTATOR CUFF OF LEFT SHOULDER, INIT: Primary | ICD-10-CM

## 2023-02-24 PROCEDURE — 97530 THERAPEUTIC ACTIVITIES: CPT | Mod: PN

## 2023-02-24 NOTE — PROGRESS NOTES
[facility] Occupational Therapy Daily Treatment Note   Date: 2/24/2023  Name: Jorge Ellsworth  Clinic Number: [id number]  Therapy Diagnosis:        Encounter Diagnosis   Name Primary?    Strain of musc/tend the rotator cuff of left shoulder, init     Physician: Sav Mayo MD  Physician Orders: eval and treat  2-3 times a week for 4-6 weeks.   Medical Diagnosis: S46.012A (ICD-10-CM) - Strain of musc/tend the rotator cuff of left shoulder  Surgical Procedure and Date: N/A  Evaluation Date: 1/24/2023  Insurance Authorization Period Expiration: 05/02/23  Plan of Care Certification Period: 1/24/23-3/10/23  Date of Return to MD: after completes therapy  Visit # / Visits authorized: Evette 1/1   4/10  Time In: 10:55  Time Out:11:47  Total Billable Time: 45 minutes    Precautions:  Standard  FOTO  2/24/23  62% (+2%)   Subjective   Pt reports: Was really sore in my chest and neck that day I came but better after.   was compliant with home exercise program given    Response to previous treatment: was a little sore  Functional change: none noted  Pain: 0/10    little discomfort in forearm    in past week 6/10  2 times last short period of time   Location: left shoulder      Objective   Patient received the following treatment:   Therapeutic activities to improve functional performance with use of dynamic activities for 45 minutes including:  -after being cleared for contradictions      Moist heat to left shoulder for 8 minutes to increase blood flow, circulation, and tissue elasticity, and pain management began Soft tissue mobilization after 5 minutes to levator and upper trapezius area for 5 minutes to increase tissue elasticity and decrease pain   - Joint mobilizations, grade II for 2 minutes to increase joint mobility, ROM and for pain management. Distraction and inferior and posterior glides of glenohumeral  joint  -Active assistive/Passive range of motion shoulder all planes in reclined  -prone 2#  extension and row; 1# horizontal abduction 2 x 10 repetitions each  -In reclined horizontal abduction with various combinations of elbow extension-  -ADDED: RED theraband flexion, abduction2 x 10 repetitions, and attempted D2 flexion and horizontal abduction -unable due to very frequent popping in the shoulder  -in right sidelying 1# abduction, 2# (increased by 1#) external rotation @ 0 dgs with towel roll  x 2 x 10 repetitions each  -in supine, while heat on cervical rotation right and left and rotation bilaterally 10 repetitions each   - seated row 20# 2 x 10 repetitions; seat and chest pad at 3  -in sitting posture and chin tucks 3 repetitions holding for 30 seconds    Instruction and demonstration given of all introduced   Patient required moderate cuing for correct performance of exercise.    Home Exercises and Education Provided   Education provided:  - discussed insurance limitation  - Progress towards goals     Written Home Exercises Provided:2/22 prone extension row and horizontal abduction as above 2/09/23 pectoralis corner stretches 30 seconds hold 3 repetitions 2 times a day 2/01/23 theraband exercise as above and given RED theraband   Patient instructed to cont prior HEP. Exercises were reviewed and he was able to demonstrate them prior to the end of the session. he demonstrated good  understanding of the HEP provided. See EMR under Patient Instructions for exercises provided prior visit.  And 2/01/23, 2/22/23    Assessment   Patient with pain for a day post last session. He noted tightness at nick and throughout forearm into hand.  Popping frequently at shoulder with theraband, had to discontinue with attempts of D2 flexion and horizontal abduction. He was able to tolerate increase in weight used for external rotation @ 0 degrees and abduction. Patient noting tightness and pain at forearm and discomfort at pectoralis  with most exercises. FOTO score improved by 2%. Pt  will continue to benefit from  skilled OT to further address pain and deficits in ROM and strength which are hindering ability to perform self care and IADLs. Patient's cultural,spiritual, and educational needs were considered    Goals:  Short Term Goals  Independent in home program of posture and scapula strengthening in 3 visits-partially met  Patient with decrease in constant pain to frequent pain and no greater than 5/10 in 3 weeks-ongoing  Patient with increase in shoulder abduction to WNL in 3 weeks-met  Patient noting able to perform self care with decrease in symptoms in 3 weeks-met  Long Term Goals  Patient with decrease in pain to intermittent and able to sleep with little to no increase in symptoms in 4 weeks-met  Patient observed to maintain better posture during therapy session in 4 weeks  Patient with decrease in pain and increase in use for household tasks including lifting with little to no increase in symptoms in 6 weeks ongoing  Patient with reports of no longer with shooting pain and decrease in worst pain to 3/10 in 6 weeks-noted in the forearm  Improve FOTO score by 8 points indicating improved function of left upper extremity  Plan   Continue skilled therapy 2 times a week for 3 weeks then may decrease to 1 time a week for 2 weeks  (will reassess periodically and adjust as needed) including therapeutic exercises and activities, manual therapy, and pain modalities  as needed   next session: MARTHA Lao

## 2023-02-27 ENCOUNTER — CLINICAL SUPPORT (OUTPATIENT)
Dept: REHABILITATION | Facility: HOSPITAL | Age: 23
End: 2023-02-27
Payer: MEDICAID

## 2023-02-27 DIAGNOSIS — S46.012A STRAIN OF MUSC/TEND THE ROTATOR CUFF OF LEFT SHOULDER, INIT: Primary | ICD-10-CM

## 2023-02-27 PROCEDURE — 97530 THERAPEUTIC ACTIVITIES: CPT | Mod: PN

## 2023-02-27 NOTE — PROGRESS NOTES
[facility] Occupational Therapy Daily Treatment Note   Date: 2/27/2023  Name: Jorge Ellsworth  Clinic Number: [id number]  Therapy Diagnosis:        Encounter Diagnosis   Name Primary?    Strain of musc/tend the rotator cuff of left shoulder, init     Physician: Sav Mayo MD  Physician Orders: eval and treat  2-3 times a week for 4-6 weeks.   Medical Diagnosis: S46.012A (ICD-10-CM) - Strain of musc/tend the rotator cuff of left shoulder  Surgical Procedure and Date: N/A  Evaluation Date: 1/24/2023  Insurance Authorization Period Expiration: 05/02/23  Plan of Care Certification Period: 1/24/23-3/10/23  Date of Return to MD: after completes therapy  Visit # / Visits authorized: Evette 1/1   5/10  Time In: 10:17  Time Out:11:00  Total Billable Time: 43 minutes    Precautions:  Standard  FOTO  2/24/23  62% (+2%)   Subjective   Pt reports: Shoulder is doing Okay, hasn't popped since saw you last time. Feels stronger, using it more. Right wrist is hurting like the left one started.    was compliant with home exercise program given    Response to previous treatment: unremarkable   Functional change: as above  Pain: 0/10      Location: left shoulder      Objective   Patient received the following treatment:   Therapeutic activities to improve functional performance with use of dynamic activities for 43 minutes including:   -Active assistive/Passive range of motion shoulder all planes in reclined  -prone 2# extension and row; 1# horizontal abduction 2 x 10 repetitions each  -In reclined horizontal abduction with various combinations of elbow extension-  -in right sidelying 2# (increased by 1#) abduction, 2# external rotation @ 0 dgs with towel roll  x 2 x 10 repetitions each  - seated row 20# 2 x 10 repetitions; seat and chest pad at 3  -in sitting posture and chin tucks 3 repetitions holding for 30 seconds   ADDED:  serratus  punches 4#  2 x 10 repetitions   ADDED: UBE level 1   2.5 minutes forward  and backward  ADDED scapula rhythmic stabilization in supine with 90 degrees flexion with shaking of GREEN flexbar  difficulty maintaining shaking  ADDED: external rotation @90 degrees in sitting arm propped on mat with wedge 3# 2 x 10 repetitions   Instruction and demonstration given of all introduced   Patient required moderate cuing for correct performance of exercise.    Home Exercises and Education Provided   Education provided:  - discussed insurance limitation  - Progress towards goals     Written Home Exercises Provided:2/22 prone extension row and horizontal abduction as above 2/09/23 pectoralis corner stretches 30 seconds hold 3 repetitions 2 times a day 2/01/23 theraband exercise as above and given RED theraband   Patient instructed to cont prior HEP. Exercises were reviewed and he was able to demonstrate them prior to the end of the session. he demonstrated good  understanding of the HEP provided. See EMR under Patient Instructions for exercises provided prior visit.  And 2/01/23, 2/22/23    Assessment   Patient reporting left shoulder doing okay, no popping since last visit and feeling stronger, able to use more. He is beginning with right wrist pain. Patient with difficulty maintaining shaking of flexbar with rhythmic stabilization. He had no complaints of pectoralis pain or pain down upper ar to elbow as in previous sessions. He performed resistive exercise with little to no discomfort just noted effort and fatigue.  Pt  will continue to benefit from skilled OT to further address pain and deficits in ROM and strength which are hindering ability to perform self care and IADLs. Patient's cultural,spiritual, and educational needs were considered    Goals:  Short Term Goals  Independent in home program of posture and scapula strengthening in 3 visits-partially met  Patient with decrease in constant pain to frequent pain and no greater than 5/10 in 3 weeks-met  Patient with increase in shoulder abduction  to WNL in 3 weeks-met  Patient noting able to perform self care with decrease in symptoms in 3 weeks-met  Long Term Goals  Patient with decrease in pain to intermittent and able to sleep with little to no increase in symptoms in 4 weeks-met  Patient observed to maintain better posture during therapy session in 4 weeks  Patient with decrease in pain and increase in use for household tasks including lifting with little to no increase in symptoms in 6 weeks ongoing  Patient with reports of no longer with shooting pain and decrease in worst pain to 3/10 in 6 weeks-noted in the forearm  Improve FOTO score by 8 points indicating improved function of left upper extremity-ongoing  Plan   Continue skilled therapy 2 times a week for 3 weeks then may decrease to 1 time a week for 2 weeks  (will reassess periodically and adjust as needed) including therapeutic exercises and activities, manual therapy, and pain modalities  as needed   next session:MARTHA Ga

## 2023-03-01 ENCOUNTER — CLINICAL SUPPORT (OUTPATIENT)
Dept: REHABILITATION | Facility: HOSPITAL | Age: 23
End: 2023-03-01
Payer: MEDICAID

## 2023-03-01 DIAGNOSIS — S46.012A STRAIN OF MUSC/TEND THE ROTATOR CUFF OF LEFT SHOULDER, INIT: Primary | ICD-10-CM

## 2023-03-01 PROCEDURE — 97530 THERAPEUTIC ACTIVITIES: CPT | Mod: PN

## 2023-03-01 NOTE — PROGRESS NOTES
[facility] Occupational Therapy Daily Treatment Note   Date: 3/1/2023  Name: Jorge Ellsworth  Clinic Number: [id number]  Therapy Diagnosis:        Encounter Diagnosis   Name Primary?    Strain of musc/tend the rotator cuff of left shoulder, init     Physician: Sav Mayo MD  Physician Orders: eval and treat  2-3 times a week for 4-6 weeks.   Medical Diagnosis: S46.012A (ICD-10-CM) - Strain of musc/tend the rotator cuff of left shoulder  Surgical Procedure and Date: N/A  Evaluation Date: 1/24/2023  Insurance Authorization Period Expiration: 05/02/23  Plan of Care Certification Period: 1/24/23-3/10/23  Date of Return to MD: after completes therapy  Visit # / Visits authorized: Evette 1/1   6/10  Time In: 10:46  Time Out:11:30  Total Billable Time: 44 minutes    Precautions:  Standard  FOTO  2/24/23  62% (+2%)   Subjective   Pt reports: Noting a little tightness in the forearm but shoulder is fine. Can feel the change in the arm when the weather changes. My arm is doing better. It used to hurt every day now it doesn't. In the past week might have hurt twice and hasn't popped.   was compliant with home exercise program given    Response to previous treatment: no problems   Functional change: as above When playing video games making sure sitting upright more  Pain: 0/10 shoulder     left forearm noted tightness at beginning of session resolved by end of session  Location: left shoulder      Objective   Patient received the following treatment:   Therapeutic activities to improve functional performance with use of dynamic activities for 44 minutes including:   -Active assistive/Passive range of motion shoulder all planes in reclined  -prone 2# extension and row  2 x 10 repetitions each  -In reclined horizontal abduction with various combinations of elbow extension-  -in right sidelying 2#  abduction 2 x 10 repetitions; 2# external rotation @ 0 dgs with towel roll  x 16 repetitions then 4  repetitions; 1# horizontal abduction 20 repetitions   - seated row 20# 15 then 5 repetitions; seat and chest pad at 3  -in standing against wall erect posture 3 repetitions holding for 1 minute  - serratus  punches 4#  2 x 10 repetitions   ADDED: serratus star RED theraband 2 x 10 repetitions   - UBE level 1.6 (increased by.6)   3 minutes forward and backward-emphasized posture throughout  -scapula rhythmic stabilization in supine with 90 degrees flexion with shaking of GREEN flexbar  difficulty maintaining shaking 3 repetitions x 1 minute  -external rotation @90 degrees in sitting arm propped on mat with wedge 3# 2 x 10 repetitions   Instruction and demonstration given of all introduced   Patient required moderate cuing for correct performance of exercise.    Home Exercises and Education Provided   Education provided:  - discussed insurance limitation  - Progress towards goals     Written Home Exercises Provided:3/01 add serratus star 20 repetitions daily 2/22 prone extension row and horizontal abduction as above 2/09/23 pectoralis corner stretches 30 seconds hold 3 repetitions 2 times a day 2/01/23 theraband exercise as above and given RED theraband   Patient instructed to cont prior HEP. Exercises were reviewed and he was able to demonstrate them prior to the end of the session. he demonstrated good  understanding of the HEP provided. See EMR under Patient Instructions for exercises provided prior visit.  And 2/01/23, 2/22/23    Assessment   Patient noting 2 episodes of shoulder pain in past week. He noted forearm pain as entered clinic which was resolved by end of session. During session, popping at shoulder a few times with passive abduction. He performed all exercise with effort and minimal discomfort. Serratus star -little pain at UT area and discomfort at chest post prone exercise.  Pt  will continue to benefit from skilled OT to further address pain and deficits in ROM and strength which are hindering  ability to perform self care and IADLs. Patient's cultural,spiritual, and educational needs were considered    Goals:  Short Term Goals  Independent in home program of posture and scapula strengthening in 3 visits-partially met  Patient with decrease in constant pain to frequent pain and no greater than 5/10 in 3 weeks-met  Patient with increase in shoulder abduction to WNL in 3 weeks-met  Patient noting able to perform self care with decrease in symptoms in 3 weeks-met  Long Term Goals  Patient with decrease in pain to intermittent and able to sleep with little to no increase in symptoms in 4 weeks-met  Patient observed to maintain better posture during therapy session in 4 weeks  Patient with decrease in pain and increase in use for household tasks including lifting with little to no increase in symptoms in 6 weeks ongoing  Patient with reports of no longer with shooting pain and decrease in worst pain to 3/10 in 6 weeks-noted in the forearm  Improve FOTO score by 8 points indicating improved function of left upper extremity-ongoing  Plan   Continue skilled therapy 2 times a week for 2 weeks then may decrease to 1 time a week for 2 weeks  (will reassess periodically and adjust as needed) including therapeutic exercises and activities, manual therapy, and pain modalities  as needed   next session:    MARTHA Carey

## 2023-03-08 ENCOUNTER — DOCUMENTATION ONLY (OUTPATIENT)
Dept: REHABILITATION | Facility: HOSPITAL | Age: 23
End: 2023-03-08
Payer: MEDICAID

## 2023-03-08 NOTE — PROGRESS NOTES
Patient did not show for his scheduled appt this morning. He was called and a message with left that he may come in at 1:30 today and to call us if he is able to do so. MARTHA Carey

## 2023-03-13 ENCOUNTER — DOCUMENTATION ONLY (OUTPATIENT)
Dept: REHABILITATION | Facility: HOSPITAL | Age: 23
End: 2023-03-13
Payer: MEDICAID

## 2023-05-06 ENCOUNTER — HOSPITAL ENCOUNTER (EMERGENCY)
Facility: HOSPITAL | Age: 23
Discharge: HOME | End: 2023-05-07
Attending: EMERGENCY MEDICINE
Payer: MEDICAID

## 2023-05-06 DIAGNOSIS — M79.606 LEG PAIN: ICD-10-CM

## 2023-05-06 DIAGNOSIS — M79.609 MUSCULOSKELETAL PAIN OF EXTREMITY: Primary | ICD-10-CM

## 2023-05-06 DIAGNOSIS — M25.539 WRIST PAIN: ICD-10-CM

## 2023-05-06 DIAGNOSIS — M25.569 KNEE PAIN: ICD-10-CM

## 2023-05-06 DIAGNOSIS — M79.673 FOOT PAIN: ICD-10-CM

## 2023-05-06 LAB
ALBUMIN SERPL BCP-MCNC: 5.3 G/DL (ref 3.5–5.2)
ALP SERPL-CCNC: 48 U/L (ref 55–135)
ALT SERPL W/O P-5'-P-CCNC: 12 U/L (ref 10–44)
ANION GAP SERPL CALC-SCNC: 9 MMOL/L (ref 8–16)
AST SERPL-CCNC: 18 U/L (ref 10–40)
BASOPHILS # BLD AUTO: 0.02 K/UL (ref 0–0.2)
BASOPHILS NFR BLD: 0.3 % (ref 0–1.9)
BILIRUB SERPL-MCNC: 1.5 MG/DL (ref 0.1–1)
BILIRUB UR QL STRIP: NEGATIVE
BUN SERPL-MCNC: 15 MG/DL (ref 6–20)
CALCIUM SERPL-MCNC: 9.6 MG/DL (ref 8.7–10.5)
CHLORIDE SERPL-SCNC: 107 MMOL/L (ref 95–110)
CK SERPL-CCNC: 160 U/L (ref 20–200)
CLARITY UR: CLEAR
CO2 SERPL-SCNC: 26 MMOL/L (ref 23–29)
COLOR UR: YELLOW
CREAT SERPL-MCNC: 0.9 MG/DL (ref 0.5–1.4)
DIFFERENTIAL METHOD: ABNORMAL
EOSINOPHIL # BLD AUTO: 0.1 K/UL (ref 0–0.5)
EOSINOPHIL NFR BLD: 1.9 % (ref 0–8)
ERYTHROCYTE [DISTWIDTH] IN BLOOD BY AUTOMATED COUNT: 12 % (ref 11.5–14.5)
EST. GFR  (NO RACE VARIABLE): >60 ML/MIN/1.73 M^2
GLUCOSE SERPL-MCNC: 87 MG/DL (ref 70–110)
GLUCOSE UR QL STRIP: NEGATIVE
HCT VFR BLD AUTO: 45 % (ref 40–54)
HGB BLD-MCNC: 15.4 G/DL (ref 14–18)
HGB UR QL STRIP: NEGATIVE
IMM GRANULOCYTES # BLD AUTO: 0.01 K/UL (ref 0–0.04)
IMM GRANULOCYTES NFR BLD AUTO: 0.2 % (ref 0–0.5)
KETONES UR QL STRIP: ABNORMAL
LEUKOCYTE ESTERASE UR QL STRIP: NEGATIVE
LYMPHOCYTES # BLD AUTO: 1.6 K/UL (ref 1–4.8)
LYMPHOCYTES NFR BLD: 25.3 % (ref 18–48)
MCH RBC QN AUTO: 27.1 PG (ref 27–31)
MCHC RBC AUTO-ENTMCNC: 34.2 G/DL (ref 32–36)
MCV RBC AUTO: 79 FL (ref 82–98)
MONOCYTES # BLD AUTO: 0.5 K/UL (ref 0.3–1)
MONOCYTES NFR BLD: 7.5 % (ref 4–15)
NEUTROPHILS # BLD AUTO: 4.1 K/UL (ref 1.8–7.7)
NEUTROPHILS NFR BLD: 64.8 % (ref 38–73)
NITRITE UR QL STRIP: NEGATIVE
NRBC BLD-RTO: 0 /100 WBC
PH UR STRIP: 6 [PH] (ref 5–8)
PLATELET # BLD AUTO: 169 K/UL (ref 150–450)
PMV BLD AUTO: 10.6 FL (ref 9.2–12.9)
POTASSIUM SERPL-SCNC: 3.5 MMOL/L (ref 3.5–5.1)
PROT SERPL-MCNC: 7.9 G/DL (ref 6–8.4)
PROT UR QL STRIP: ABNORMAL
RBC # BLD AUTO: 5.69 M/UL (ref 4.6–6.2)
SODIUM SERPL-SCNC: 142 MMOL/L (ref 136–145)
SP GR UR STRIP: 1.03 (ref 1–1.03)
URN SPEC COLLECT METH UR: ABNORMAL
UROBILINOGEN UR STRIP-ACNC: NEGATIVE EU/DL
WBC # BLD AUTO: 6.25 K/UL (ref 3.9–12.7)

## 2023-05-06 PROCEDURE — 99285 EMERGENCY DEPT VISIT HI MDM: CPT | Mod: 25

## 2023-05-06 PROCEDURE — 25000003 PHARM REV CODE 250

## 2023-05-06 PROCEDURE — 82550 ASSAY OF CK (CPK): CPT

## 2023-05-06 PROCEDURE — 85025 COMPLETE CBC W/AUTO DIFF WBC: CPT

## 2023-05-06 PROCEDURE — 81003 URINALYSIS AUTO W/O SCOPE: CPT

## 2023-05-06 PROCEDURE — 80053 COMPREHEN METABOLIC PANEL: CPT

## 2023-05-06 RX ORDER — ACETAMINOPHEN 325 MG/1
650 TABLET ORAL
Status: COMPLETED | OUTPATIENT
Start: 2023-05-06 | End: 2023-05-06

## 2023-05-06 RX ADMIN — ACETAMINOPHEN 650 MG: 325 TABLET ORAL at 08:05

## 2023-05-07 VITALS
OXYGEN SATURATION: 100 % | TEMPERATURE: 98 F | WEIGHT: 120 LBS | RESPIRATION RATE: 18 BRPM | HEIGHT: 67 IN | HEART RATE: 68 BPM | BODY MASS INDEX: 18.83 KG/M2 | DIASTOLIC BLOOD PRESSURE: 70 MMHG | SYSTOLIC BLOOD PRESSURE: 120 MMHG
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
## 2023-05-07 NOTE — ED NOTES
Patient identifiers for Jorge Ellsworth checked and correct.  LOC:  Jorge Ellsworth is awake, alert, and aware of environment with an appropriate affect. He is oriented x 3 and speaking appropriately.  APPEARANCE:  He is resting comfortably and in no acute distress. He is clean and well groomed, patient's clothing is properly fastened.  SKIN:  The skin is warm and dry. He has normal skin turgor and moist mucus membranes. Skin is intact; no bruising or breakdown noted.  MUSCULOSKELETAL:  He is moving all extremities well, no obvious deformities noted. Pulses intact. He does state that his left foot hurts and has a pain that goes up to his knee, he denies any injury to the foot.  RESPIRATORY:  Airway is open and patent. Respirations are spontaneous and non-labored with normal effort and rate.  CARDIAC:  He has a normal rate and rhythm. No peripheral edema noted. Capillary refill < 3 seconds.  ABDOMEN:  No distention noted.  Soft and non-tender upon palpation.  NEUROLOGICAL:  PERRL. Facial expression is symmetrical. Hand grasps are equal bilaterally. Normal sensation in all extremities when touched with finger.  Allergies reported:    Review of patient's allergies indicates:   Allergen Reactions    Penicillins Rash     OTHER NOTES:  Jorge Ellsworth is here with his friend.

## 2023-05-07 NOTE — DISCHARGE INSTRUCTIONS
Please follow up with Dr. Mayo for further evaluation of your pain.  Please continue to alternate Tylenol ibuprofen as needed for pain.  You can do ice to the area to help decrease pain.  Please return to the ED for worsening pain, any new injuries, fever, numbness and tingling in your extremities, or any new or worsening concerns.

## 2023-08-15 ENCOUNTER — HOSPITAL ENCOUNTER (EMERGENCY)
Facility: HOSPITAL | Age: 23
Discharge: HOME | End: 2023-08-15
Attending: STUDENT IN AN ORGANIZED HEALTH CARE EDUCATION/TRAINING PROGRAM
Payer: MEDICAID

## 2023-08-15 VITALS
RESPIRATION RATE: 16 BRPM | TEMPERATURE: 98 F | HEART RATE: 50 BPM | DIASTOLIC BLOOD PRESSURE: 81 MMHG | HEIGHT: 67 IN | OXYGEN SATURATION: 98 % | BODY MASS INDEX: 19.62 KG/M2 | SYSTOLIC BLOOD PRESSURE: 111 MMHG | WEIGHT: 125 LBS
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
## 2023-08-15 DIAGNOSIS — S16.1XXA CERVICAL STRAIN, ACUTE, INITIAL ENCOUNTER: ICD-10-CM
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
## 2023-08-15 PROCEDURE — 99285 EMERGENCY DEPT VISIT HI MDM: CPT | Mod: 25

## 2023-08-15 RX ORDER — METHOCARBAMOL 750 MG/1
750 TABLET, FILM COATED ORAL 3 TIMES DAILY
Qty: 15 TABLET | Refills: 0 | Status: SHIPPED | OUTPATIENT
Start: 2023-08-15 | End: 2023-08-20
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
## 2023-08-15 NOTE — DISCHARGE INSTRUCTIONS
Motrin for mild pain and swelling  Robaxin as directed for muscle spasms   Follow-up as directed for further evaluation, definitive care   Ice every 2 hours for 20 minutes   Return for any concerns
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
## 2023-09-13 ENCOUNTER — HOSPITAL ENCOUNTER (EMERGENCY)
Facility: HOSPITAL | Age: 23
Discharge: HOME | End: 2023-09-14
Attending: EMERGENCY MEDICINE
Payer: MEDICAID

## 2023-09-13 DIAGNOSIS — U07.1 COVID-19: ICD-10-CM

## 2023-09-13 DIAGNOSIS — R11.2 NAUSEA AND VOMITING, UNSPECIFIED VOMITING TYPE: Primary | ICD-10-CM

## 2023-09-13 PROCEDURE — 99284 EMERGENCY DEPT VISIT MOD MDM: CPT | Mod: 25

## 2023-09-13 RX ORDER — ONDANSETRON 2 MG/ML
4 INJECTION INTRAMUSCULAR; INTRAVENOUS
Status: COMPLETED | OUTPATIENT
Start: 2023-09-14 | End: 2023-09-14

## 2023-09-14 ENCOUNTER — PATIENT OUTREACH (OUTPATIENT)
Dept: ADMINISTRATIVE | Facility: HOSPITAL | Age: 23
End: 2023-09-14
Payer: MEDICAID

## 2023-09-14 ENCOUNTER — PATIENT MESSAGE (OUTPATIENT)
Dept: ADMINISTRATIVE | Facility: HOSPITAL | Age: 23
End: 2023-09-14
Payer: MEDICAID

## 2023-09-14 VITALS
BODY MASS INDEX: 20.89 KG/M2 | SYSTOLIC BLOOD PRESSURE: 112 MMHG | TEMPERATURE: 98 F | DIASTOLIC BLOOD PRESSURE: 57 MMHG | RESPIRATION RATE: 19 BRPM | HEIGHT: 66 IN | HEART RATE: 81 BPM | OXYGEN SATURATION: 98 % | WEIGHT: 130 LBS

## 2023-09-14 LAB
ALBUMIN SERPL BCP-MCNC: 5.2 G/DL (ref 3.5–5.2)
ALP SERPL-CCNC: 49 U/L (ref 55–135)
ALT SERPL W/O P-5'-P-CCNC: 14 U/L (ref 10–44)
ANION GAP SERPL CALC-SCNC: 9 MMOL/L (ref 8–16)
AST SERPL-CCNC: 24 U/L (ref 10–40)
BASOPHILS # BLD AUTO: 0 K/UL (ref 0–0.2)
BASOPHILS NFR BLD: 0 % (ref 0–1.9)
BILIRUB SERPL-MCNC: 1.4 MG/DL (ref 0.1–1)
BUN SERPL-MCNC: 17 MG/DL (ref 6–20)
CALCIUM SERPL-MCNC: 9.1 MG/DL (ref 8.7–10.5)
CHLORIDE SERPL-SCNC: 104 MMOL/L (ref 95–110)
CO2 SERPL-SCNC: 27 MMOL/L (ref 23–29)
CREAT SERPL-MCNC: 1.1 MG/DL (ref 0.5–1.4)
DIFFERENTIAL METHOD: ABNORMAL
EOSINOPHIL # BLD AUTO: 0 K/UL (ref 0–0.5)
EOSINOPHIL NFR BLD: 0 % (ref 0–8)
ERYTHROCYTE [DISTWIDTH] IN BLOOD BY AUTOMATED COUNT: 12 % (ref 11.5–14.5)
EST. GFR  (NO RACE VARIABLE): >60 ML/MIN/1.73 M^2
GLUCOSE SERPL-MCNC: 105 MG/DL (ref 70–110)
HCT VFR BLD AUTO: 46.6 % (ref 40–54)
HGB BLD-MCNC: 16.6 G/DL (ref 14–18)
IMM GRANULOCYTES # BLD AUTO: 0 K/UL (ref 0–0.04)
IMM GRANULOCYTES NFR BLD AUTO: 0 % (ref 0–0.5)
LIPASE SERPL-CCNC: 34 U/L (ref 4–60)
LYMPHOCYTES # BLD AUTO: 0.7 K/UL (ref 1–4.8)
LYMPHOCYTES NFR BLD: 30.1 % (ref 18–48)
MCH RBC QN AUTO: 27.9 PG (ref 27–31)
MCHC RBC AUTO-ENTMCNC: 35.6 G/DL (ref 32–36)
MCV RBC AUTO: 78 FL (ref 82–98)
MONOCYTES # BLD AUTO: 0.2 K/UL (ref 0.3–1)
MONOCYTES NFR BLD: 6.8 % (ref 4–15)
NEUTROPHILS # BLD AUTO: 1.4 K/UL (ref 1.8–7.7)
NEUTROPHILS NFR BLD: 63.1 % (ref 38–73)
NRBC BLD-RTO: 0 /100 WBC
PLATELET # BLD AUTO: 119 K/UL (ref 150–450)
PMV BLD AUTO: 10.4 FL (ref 9.2–12.9)
POTASSIUM SERPL-SCNC: 3.5 MMOL/L (ref 3.5–5.1)
PROT SERPL-MCNC: 8.4 G/DL (ref 6–8.4)
RBC # BLD AUTO: 5.96 M/UL (ref 4.6–6.2)
SODIUM SERPL-SCNC: 140 MMOL/L (ref 136–145)
WBC # BLD AUTO: 2.19 K/UL (ref 3.9–12.7)

## 2023-09-14 PROCEDURE — 80053 COMPREHEN METABOLIC PANEL: CPT | Performed by: EMERGENCY MEDICINE

## 2023-09-14 PROCEDURE — 96375 TX/PRO/DX INJ NEW DRUG ADDON: CPT

## 2023-09-14 PROCEDURE — 85025 COMPLETE CBC W/AUTO DIFF WBC: CPT | Performed by: EMERGENCY MEDICINE

## 2023-09-14 PROCEDURE — 96361 HYDRATE IV INFUSION ADD-ON: CPT

## 2023-09-14 PROCEDURE — 83690 ASSAY OF LIPASE: CPT | Performed by: EMERGENCY MEDICINE

## 2023-09-14 PROCEDURE — 25000003 PHARM REV CODE 250: Performed by: EMERGENCY MEDICINE

## 2023-09-14 PROCEDURE — 63600175 PHARM REV CODE 636 W HCPCS: Performed by: EMERGENCY MEDICINE

## 2023-09-14 PROCEDURE — 96365 THER/PROPH/DIAG IV INF INIT: CPT

## 2023-09-14 RX ORDER — PROMETHAZINE HYDROCHLORIDE 25 MG/1
25 SUPPOSITORY RECTAL EVERY 6 HOURS PRN
Qty: 10 SUPPOSITORY | Refills: 0 | Status: SHIPPED | OUTPATIENT
Start: 2023-09-14

## 2023-09-14 RX ORDER — SODIUM CHLORIDE 9 MG/ML
1000 INJECTION, SOLUTION INTRAVENOUS
Status: COMPLETED | OUTPATIENT
Start: 2023-09-14 | End: 2023-09-14

## 2023-09-14 RX ADMIN — SODIUM CHLORIDE 1000 ML: 0.9 INJECTION, SOLUTION INTRAVENOUS at 02:09

## 2023-09-14 RX ADMIN — PROMETHAZINE HYDROCHLORIDE 12.5 MG: 25 INJECTION INTRAMUSCULAR; INTRAVENOUS at 02:09

## 2023-09-14 RX ADMIN — SODIUM CHLORIDE 1000 ML: 0.9 INJECTION, SOLUTION INTRAVENOUS at 12:09

## 2023-09-14 RX ADMIN — ONDANSETRON 4 MG: 2 INJECTION INTRAMUSCULAR; INTRAVENOUS at 12:09

## 2023-09-14 NOTE — ED PROVIDER NOTES
Encounter Date: 9/13/2023       History     Chief Complaint   Patient presents with    Vomiting     Pt arrived by ems, pt states covid +, nausea and vomiting      Patient presents emergency department with reported nausea vomiting onset today patient recently diagnosed with COVID patient denies any significant past medical history he was given Zofran EN route to the emergency department by EMS but continues to have nausea vomiting he denies any cough he reports some diarrhea        Review of patient's allergies indicates:   Allergen Reactions    Penicillins Rash     Past Medical History:   Diagnosis Date    ADHD      No past surgical history on file.  Family History   Problem Relation Age of Onset    Diabetes Mother      Social History     Tobacco Use    Smoking status: Never    Smokeless tobacco: Never   Substance Use Topics    Alcohol use: Never    Drug use: Never     Review of Systems   Constitutional:  Positive for fever.   HENT:  Positive for congestion.    Gastrointestinal:  Positive for diarrhea, nausea and vomiting. Negative for abdominal pain.   All other systems reviewed and are negative.      Physical Exam     Initial Vitals [09/13/23 2343]   BP Pulse Resp Temp SpO2   (!) 141/95 (!) 58 18 -- 100 %      MAP       --         Physical Exam    Constitutional: He appears well-developed and well-nourished. No distress.   HENT:   Head: Normocephalic and atraumatic.   Right Ear: External ear normal.   Left Ear: External ear normal.   Mouth/Throat: Oropharynx is clear and moist.   Eyes: Pupils are equal, round, and reactive to light.   Neck: Neck supple.   Normal range of motion.  Cardiovascular:  Normal rate, regular rhythm, S1 normal, S2 normal, normal heart sounds and intact distal pulses.           Pulmonary/Chest: Breath sounds normal.   Abdominal: Abdomen is soft. Bowel sounds are normal. There is no abdominal tenderness.   Musculoskeletal:         General: Normal range of motion.      Cervical back: Normal  range of motion and neck supple.     Neurological: He is alert and oriented to person, place, and time. He has normal strength. GCS score is 15. GCS eye subscore is 4. GCS verbal subscore is 5. GCS motor subscore is 6.   Skin: Skin is warm and dry. No rash noted.   Psychiatric: He has a normal mood and affect. His behavior is normal.         ED Course   Procedures  Labs Reviewed   CBC W/ AUTO DIFFERENTIAL - Abnormal; Notable for the following components:       Result Value    WBC 2.19 (*)     MCV 78 (*)     Platelets 119 (*)     Gran # (ANC) 1.4 (*)     Lymph # 0.7 (*)     Mono # 0.2 (*)     All other components within normal limits   COMPREHENSIVE METABOLIC PANEL - Abnormal; Notable for the following components:    Total Bilirubin 1.4 (*)     Alkaline Phosphatase 49 (*)     All other components within normal limits   LIPASE   URINALYSIS, REFLEX TO URINE CULTURE          Imaging Results    None          Medications   ondansetron injection 4 mg (4 mg Intravenous Given 9/14/23 0005)   sodium chloride 0.9% bolus 1,000 mL 1,000 mL (0 mLs Intravenous Stopped 9/14/23 0222)   promethazine (PHENERGAN) 12.5 mg in dextrose 5 % (D5W) 50 mL IVPB (0 mg Intravenous Stopped 9/14/23 0248)   0.9%  NaCl infusion (1,000 mLs Intravenous New Bag 9/14/23 0248)     Medical Decision Making  Laboratory evaluation reviewed patient received IV fluids Zofran and Phenergan in the emergency department with improvement in his nausea tolerating p.o. without difficulty release with Phenergan suppositories rest clear liquids advance as tolerated    Amount and/or Complexity of Data Reviewed  Labs: ordered. Decision-making details documented in ED Course.    Risk  Prescription drug management.                               Clinical Impression:   Final diagnoses:  [R11.2] Nausea and vomiting, unspecified vomiting type (Primary)  [U07.1] COVID-19        ED Disposition Condition    Discharge Stable          ED Prescriptions       Medication Sig Dispense  Start Date End Date Auth. Provider    promethazine (PHENERGAN) 25 MG suppository Place 1 suppository (25 mg total) rectally every 6 (six) hours as needed for Nausea. 10 suppository 9/14/2023 -- Luis E Ruiz MD          Follow-up Information    None          Luis E Ruiz MD  09/14/23 1236

## 2024-07-09 ENCOUNTER — PATIENT MESSAGE (OUTPATIENT)
Dept: ADMINISTRATIVE | Facility: HOSPITAL | Age: 24
End: 2024-07-09
Payer: MEDICAID

## 2024-07-29 DIAGNOSIS — R10.2 PELVIC AND PERINEAL PAIN: Primary | ICD-10-CM

## 2024-08-02 ENCOUNTER — HOSPITAL ENCOUNTER (OUTPATIENT)
Dept: RADIOLOGY | Facility: HOSPITAL | Age: 24
Discharge: HOME | End: 2024-08-02
Attending: NURSE PRACTITIONER
Payer: MEDICAID

## 2024-08-02 DIAGNOSIS — R10.2 PELVIC AND PERINEAL PAIN: ICD-10-CM

## 2024-08-02 PROCEDURE — 76856 US EXAM PELVIC COMPLETE: CPT | Mod: TC,PO

## 2024-08-02 PROCEDURE — 76856 US EXAM PELVIC COMPLETE: CPT | Mod: 26,,, | Performed by: RADIOLOGY

## 2024-08-03 ENCOUNTER — HOSPITAL ENCOUNTER (EMERGENCY)
Facility: HOSPITAL | Age: 24
Discharge: HOME | End: 2024-08-03
Attending: EMERGENCY MEDICINE
Payer: MEDICAID

## 2024-08-03 VITALS
HEART RATE: 66 BPM | HEIGHT: 66 IN | WEIGHT: 117 LBS | BODY MASS INDEX: 18.8 KG/M2 | OXYGEN SATURATION: 99 % | RESPIRATION RATE: 16 BRPM | SYSTOLIC BLOOD PRESSURE: 139 MMHG | TEMPERATURE: 98 F | DIASTOLIC BLOOD PRESSURE: 94 MMHG

## 2024-08-03 DIAGNOSIS — S81.852A DOG BITE OF LEFT LOWER LEG, INITIAL ENCOUNTER: Primary | ICD-10-CM
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
## 2024-08-03 PROCEDURE — 25000003 PHARM REV CODE 250

## 2024-08-03 PROCEDURE — 90471 IMMUNIZATION ADMIN: CPT | Performed by: NURSE PRACTITIONER

## 2024-08-03 PROCEDURE — 99284 EMERGENCY DEPT VISIT MOD MDM: CPT | Mod: 25

## 2024-08-03 PROCEDURE — 63600175 PHARM REV CODE 636 W HCPCS: Performed by: NURSE PRACTITIONER

## 2024-08-03 PROCEDURE — 90715 TDAP VACCINE 7 YRS/> IM: CPT | Performed by: NURSE PRACTITIONER

## 2024-08-03 RX ORDER — CLINDAMYCIN HYDROCHLORIDE 300 MG/1
300 CAPSULE ORAL EVERY 8 HOURS
Qty: 30 CAPSULE | Refills: 0 | Status: SHIPPED | OUTPATIENT
Start: 2024-08-03 | End: 2024-08-13

## 2024-08-03 RX ORDER — BACITRACIN 500 [USP'U]/G
OINTMENT TOPICAL
Status: COMPLETED | OUTPATIENT
Start: 2024-08-03 | End: 2024-08-03

## 2024-08-03 RX ORDER — ACETAMINOPHEN 500 MG
1000 TABLET ORAL
Status: DISCONTINUED | OUTPATIENT
Start: 2024-08-03 | End: 2024-08-03 | Stop reason: HOSPADM

## 2024-08-03 RX ORDER — DOXYCYCLINE 100 MG/1
100 CAPSULE ORAL 2 TIMES DAILY
Qty: 20 CAPSULE | Refills: 0 | Status: SHIPPED | OUTPATIENT
Start: 2024-08-03 | End: 2024-08-13

## 2024-08-03 RX ORDER — HYDROCODONE BITARTRATE AND ACETAMINOPHEN 5; 325 MG/1; MG/1
1 TABLET ORAL EVERY 6 HOURS PRN
Qty: 12 TABLET | Refills: 0 | Status: SHIPPED | OUTPATIENT
Start: 2024-08-03 | End: 2024-08-06

## 2024-08-03 RX ADMIN — CLOSTRIDIUM TETANI TOXOID ANTIGEN (FORMALDEHYDE INACTIVATED), CORYNEBACTERIUM DIPHTHERIAE TOXOID ANTIGEN (FORMALDEHYDE INACTIVATED), BORDETELLA PERTUSSIS TOXOID ANTIGEN (GLUTARALDEHYDE INACTIVATED), BORDETELLA PERTUSSIS FILAMENTOUS HEMAGGLUTININ ANTIGEN (FORMALDEHYDE INACTIVATED), BORDETELLA PERTUSSIS PERTACTIN ANTIGEN, AND BORDETELLA PERTUSSIS FIMBRIAE 2/3 ANTIGEN 0.5 ML: 5; 2; 2.5; 5; 3; 5 INJECTION, SUSPENSION INTRAMUSCULAR at 06:08

## 2024-08-03 RX ADMIN — BACITRACIN: 500 OINTMENT TOPICAL at 07:08

## 2024-08-21 DIAGNOSIS — N50.819 TESTICULAR PAIN, UNSPECIFIED: ICD-10-CM

## 2024-08-21 DIAGNOSIS — R10.13 EPIGASTRIC PAIN: Primary | ICD-10-CM

## 2024-08-22 DIAGNOSIS — Z79.1 ENCOUNTER FOR LONG-TERM (CURRENT) USE OF NSAIDS: ICD-10-CM

## 2024-08-22 DIAGNOSIS — R63.4 WEIGHT LOSS: ICD-10-CM

## 2024-08-22 DIAGNOSIS — R10.13 EPIGASTRIC PAIN: Primary | ICD-10-CM

## 2024-08-22 DIAGNOSIS — R11.2 N&V (NAUSEA AND VOMITING): ICD-10-CM

## 2024-08-23 ENCOUNTER — HOSPITAL ENCOUNTER (OUTPATIENT)
Dept: RADIOLOGY | Facility: HOSPITAL | Age: 24
Discharge: HOME | End: 2024-08-23
Attending: SPECIALIST
Payer: MEDICAID

## 2024-08-23 DIAGNOSIS — R11.2 N&V (NAUSEA AND VOMITING): ICD-10-CM

## 2024-08-23 DIAGNOSIS — Z79.1 ENCOUNTER FOR LONG-TERM (CURRENT) USE OF NSAIDS: ICD-10-CM

## 2024-08-23 DIAGNOSIS — R63.4 WEIGHT LOSS: ICD-10-CM

## 2024-08-23 DIAGNOSIS — R10.13 EPIGASTRIC PAIN: ICD-10-CM

## 2024-08-23 PROCEDURE — 76700 US EXAM ABDOM COMPLETE: CPT | Mod: TC,PO

## 2024-08-23 PROCEDURE — 76700 US EXAM ABDOM COMPLETE: CPT | Mod: 26,,, | Performed by: RADIOLOGY

## 2024-08-28 ENCOUNTER — HOSPITAL ENCOUNTER (EMERGENCY)
Facility: HOSPITAL | Age: 24
Discharge: HOME | End: 2024-08-28
Attending: EMERGENCY MEDICINE
Payer: MEDICAID

## 2024-08-28 VITALS
HEART RATE: 86 BPM | DIASTOLIC BLOOD PRESSURE: 82 MMHG | OXYGEN SATURATION: 98 % | RESPIRATION RATE: 16 BRPM | TEMPERATURE: 98 F | HEIGHT: 66 IN | WEIGHT: 117.06 LBS | BODY MASS INDEX: 18.81 KG/M2 | SYSTOLIC BLOOD PRESSURE: 128 MMHG

## 2024-08-28 DIAGNOSIS — S63.501A SPRAIN OF RIGHT WRIST, INITIAL ENCOUNTER: Primary | ICD-10-CM

## 2024-08-28 DIAGNOSIS — S69.90XA WRIST INJURY: ICD-10-CM

## 2024-08-28 PROCEDURE — 25000003 PHARM REV CODE 250: Performed by: NURSE PRACTITIONER

## 2024-08-28 PROCEDURE — 99283 EMERGENCY DEPT VISIT LOW MDM: CPT | Mod: 25

## 2024-08-28 RX ORDER — ACETAMINOPHEN 325 MG/1
650 TABLET ORAL
Status: COMPLETED | OUTPATIENT
Start: 2024-08-28 | End: 2024-08-28

## 2024-08-28 RX ADMIN — ACETAMINOPHEN 650 MG: 325 TABLET ORAL at 01:08
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
## 2024-11-18 ENCOUNTER — HOSPITAL ENCOUNTER (EMERGENCY)
Facility: HOSPITAL | Age: 24
Discharge: HOME | End: 2024-11-18
Attending: EMERGENCY MEDICINE
Payer: MEDICAID

## 2024-11-18 VITALS
OXYGEN SATURATION: 100 % | WEIGHT: 125 LBS | TEMPERATURE: 99 F | SYSTOLIC BLOOD PRESSURE: 135 MMHG | HEIGHT: 67 IN | DIASTOLIC BLOOD PRESSURE: 87 MMHG | RESPIRATION RATE: 16 BRPM | HEART RATE: 84 BPM | BODY MASS INDEX: 19.62 KG/M2

## 2024-11-18 DIAGNOSIS — M25.512 LEFT SHOULDER PAIN, UNSPECIFIED CHRONICITY: Primary | ICD-10-CM

## 2024-11-18 PROCEDURE — 99284 EMERGENCY DEPT VISIT MOD MDM: CPT | Mod: 25

## 2024-11-18 RX ORDER — METHOCARBAMOL 750 MG/1
750 TABLET, FILM COATED ORAL 3 TIMES DAILY
Qty: 15 TABLET | Refills: 0 | Status: SHIPPED | OUTPATIENT
Start: 2024-11-18 | End: 2024-11-23

## 2024-11-18 RX ORDER — NAPROXEN 375 MG/1
375 TABLET ORAL 2 TIMES DAILY WITH MEALS
Qty: 20 TABLET | Refills: 0 | Status: SHIPPED | OUTPATIENT
Start: 2024-11-18

## 2025-08-08 ENCOUNTER — HOSPITAL ENCOUNTER (EMERGENCY)
Facility: HOSPITAL | Age: 25
Discharge: HOME | End: 2025-08-08
Attending: EMERGENCY MEDICINE
Payer: MEDICAID

## 2025-08-08 VITALS
BODY MASS INDEX: 18.83 KG/M2 | HEIGHT: 67 IN | OXYGEN SATURATION: 98 % | TEMPERATURE: 98 F | HEART RATE: 76 BPM | WEIGHT: 120 LBS | SYSTOLIC BLOOD PRESSURE: 141 MMHG | RESPIRATION RATE: 17 BRPM | DIASTOLIC BLOOD PRESSURE: 99 MMHG

## 2025-08-08 DIAGNOSIS — T14.90XA INJURY: ICD-10-CM

## 2025-08-08 DIAGNOSIS — M25.531 RIGHT WRIST PAIN: ICD-10-CM

## 2025-08-08 DIAGNOSIS — S69.91XA HAND INJURY, RIGHT, INITIAL ENCOUNTER: Primary | ICD-10-CM

## 2025-08-08 PROCEDURE — 99283 EMERGENCY DEPT VISIT LOW MDM: CPT | Mod: 25

## 2025-08-08 PROCEDURE — 25000003 PHARM REV CODE 250: Performed by: NURSE PRACTITIONER

## 2025-08-08 RX ORDER — DICLOFENAC SODIUM 50 MG/1
50 TABLET, DELAYED RELEASE ORAL 3 TIMES DAILY PRN
Qty: 20 TABLET | Refills: 2 | Status: SHIPPED | OUTPATIENT
Start: 2025-08-08

## 2025-08-08 RX ADMIN — IBUPROFEN 600 MG: 400 TABLET ORAL at 11:08
